# Patient Record
Sex: MALE | Race: WHITE | NOT HISPANIC OR LATINO | Employment: OTHER | URBAN - METROPOLITAN AREA
[De-identification: names, ages, dates, MRNs, and addresses within clinical notes are randomized per-mention and may not be internally consistent; named-entity substitution may affect disease eponyms.]

---

## 2018-04-21 ENCOUNTER — APPOINTMENT (EMERGENCY)
Dept: RADIOLOGY | Facility: HOSPITAL | Age: 68
End: 2018-04-21
Attending: EMERGENCY MEDICINE
Payer: MEDICARE

## 2018-04-21 ENCOUNTER — HOSPITAL ENCOUNTER (EMERGENCY)
Facility: HOSPITAL | Age: 68
Discharge: HOME | End: 2018-04-21
Attending: EMERGENCY MEDICINE
Payer: MEDICARE

## 2018-04-21 VITALS
OXYGEN SATURATION: 96 % | TEMPERATURE: 97.4 F | WEIGHT: 231 LBS | HEART RATE: 72 BPM | BODY MASS INDEX: 36.26 KG/M2 | SYSTOLIC BLOOD PRESSURE: 143 MMHG | DIASTOLIC BLOOD PRESSURE: 77 MMHG | HEIGHT: 67 IN | RESPIRATION RATE: 18 BRPM

## 2018-04-21 DIAGNOSIS — S42.201A CLOSED FRACTURE OF PROXIMAL END OF RIGHT HUMERUS, UNSPECIFIED FRACTURE MORPHOLOGY, INITIAL ENCOUNTER: ICD-10-CM
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
## 2018-04-21 DIAGNOSIS — T14.8XXA ABRASION: ICD-10-CM

## 2018-04-21 PROCEDURE — 63700000 HC SELF-ADMINISTRABLE DRUG: Performed by: NURSE PRACTITIONER

## 2018-04-21 PROCEDURE — 73080 X-RAY EXAM OF ELBOW: CPT | Mod: RT

## 2018-04-21 PROCEDURE — 99283 EMERGENCY DEPT VISIT LOW MDM: CPT

## 2018-04-21 PROCEDURE — 73030 X-RAY EXAM OF SHOULDER: CPT | Mod: RT

## 2018-04-21 RX ORDER — LISINOPRIL 10 MG/1
10 TABLET ORAL NIGHTLY
COMMUNITY

## 2018-04-21 RX ORDER — MONTELUKAST SODIUM 10 MG/1
TABLET ORAL NIGHTLY
COMMUNITY

## 2018-04-21 RX ORDER — ROSUVASTATIN CALCIUM 20 MG/1
20 TABLET, COATED ORAL NIGHTLY
COMMUNITY

## 2018-04-21 RX ORDER — IBUPROFEN 600 MG/1
600 TABLET ORAL ONCE
Status: COMPLETED | OUTPATIENT
Start: 2018-04-21 | End: 2018-04-21

## 2018-04-21 RX ORDER — OMEPRAZOLE 20 MG/1
20 CAPSULE, DELAYED RELEASE ORAL
COMMUNITY

## 2018-04-21 RX ORDER — FLUTICASONE PROPIONATE 110 UG/1
2 AEROSOL, METERED RESPIRATORY (INHALATION) 2 TIMES DAILY PRN
COMMUNITY

## 2018-04-21 RX ORDER — ASPIRIN 81 MG/1
81 TABLET ORAL DAILY
COMMUNITY

## 2018-04-21 RX ORDER — AMLODIPINE BESYLATE 5 MG/1
5 TABLET ORAL DAILY
COMMUNITY

## 2018-04-21 RX ADMIN — IBUPROFEN 600 MG: 600 TABLET ORAL at 16:54

## 2018-04-21 ASSESSMENT — ENCOUNTER SYMPTOMS
BACK PAIN: 0
DIZZINESS: 0
WEAKNESS: 0
COUGH: 0
HEADACHES: 0
SHORTNESS OF BREATH: 0
NAUSEA: 0
WOUND: 1
COLOR CHANGE: 0
CONFUSION: 0
NECK PAIN: 0
LIGHT-HEADEDNESS: 0
VOMITING: 0
FATIGUE: 0
NUMBNESS: 0
DYSURIA: 0
FLANK PAIN: 0
ABDOMINAL PAIN: 0
DIFFICULTY URINATING: 0

## 2018-04-21 NOTE — DISCHARGE INSTRUCTIONS
As discussed please call your primary care doctor and orthopedist today to inform them of your ER visit and arrange a follow-up appointment within the next 2-3 days.  If you do not have one we have provided you with one. You do not have to see this provider in particular, just call the office and ask for the earliest available appointment.  Please return immediately for any worsening, new or concerning symptoms such as confusion, dizziness, numbness, color change, etc    Please take ibuprofen (i.e. Motrin or Advil) as per package instructions with food do not exceed 3200mg in 24 hours    Please take acetaminophen (Tylenol) as per package instructions.  Please do not exceed 3 g in 24 hours    Please use ice or heat depending on which feels better for you.  Please do not apply directly to the skin.  Please do not leave in place while asleep.  Please place for 10-15 minutes and then remove it for 10-15 minutes    It is imperative that you  follow-up with an orthopedist for your injury so he/she may follow and ensure proper follow up treatment and healing.  If you do not follow-up you may run the risk in the future of poor healing, chronic pain, loss of function etc.    Please clean your wound daily with mild soap and water and dressed with topical antibiotic of your choosing. You may also cover with bandage.  Please be sure to change the dressing at least once a day.  Please be aware of any signs or symptoms of infection such as fever, redness, yellow discharge etc.  If any of these occur please seek immediate medical attention.    Please wear shoulder immobilizer until seen and cleared by orthopedics in 2-3 days. No use of R arm until that time.

## 2018-04-21 NOTE — ED PROVIDER NOTES
Patient states just prior to arrival having a mechanical fall for which she tripped on a curb injuring his right shoulder, right elbow and right knee.  Patient has abrasions to right elbow and right knee.  Abrasions very superficial and no active bleeding.  States tetanus up-to-date.  Patient states pain to right shoulder worsened with movement along with decreased range of motion.  Denies any home intervention.  Denies any head injury or loss of consciousness.  No other symptoms injury.  Right-hand dominant        History provided by:  Patient   used: No        Past Medical History:   Diagnosis Date   • CAD (coronary artery disease)    • Hypertension    • Myocardial infarction    • Type 2 diabetes mellitus (CMS/HCC) (HCC)        Past Surgical History:   Procedure Laterality Date   • CORONARY ARTERY BYPASS GRAFT         Allergies   Allergen Reactions   • Clopidogrel Hives   • Penicillins      Other reaction(s): RASH AS CHILD       History   Smoking Status   • Former Smoker   Smokeless Tobacco   • Never Used       History   Alcohol Use   • Yes     Comment: socially       History   Drug Use No       No LMP for male patient.    Review of Systems   Constitutional: Negative for fatigue.   HENT: Negative for nosebleeds.    Eyes: Negative for visual disturbance.   Respiratory: Negative for cough and shortness of breath.    Cardiovascular: Negative for chest pain.   Gastrointestinal: Negative for abdominal pain, nausea and vomiting.   Genitourinary: Negative for difficulty urinating, dysuria and flank pain.   Musculoskeletal: Negative for back pain, gait problem and neck pain.        R shoulder/R elbow pain   Skin: Positive for wound (abrasions to R elbow, R knee). Negative for color change.   Neurological: Negative for dizziness, weakness, light-headedness, numbness and headaches.   Psychiatric/Behavioral: Negative for confusion.   All other systems reviewed and are negative.      Vitals:    04/21/18  "1618 04/21/18 1619 04/21/18 1746   BP:  (!) 142/83 (!) 143/77   BP Location:   Left upper arm   Patient Position:   Lying   Pulse:  68 72   Resp:  18 18   Temp:  36.3 °C (97.4 °F)    SpO2:  97% 96%   Weight: 105 kg (231 lb)     Height: 1.702 m (5' 7\")         Physical Exam   Constitutional: He appears well-developed and well-nourished.   HENT:   Head: Normocephalic and atraumatic. Head is without raccoon's eyes and without Bob's sign.   Airway intact   Eyes: EOM and lids are normal. Pupils are equal, round, and reactive to light.   Neck: Normal range of motion.   No cervical spine tenderness   Cardiovascular: Normal rate, regular rhythm, normal heart sounds and normal pulses.    Pulmonary/Chest: Effort normal and breath sounds normal. No respiratory distress. He exhibits no tenderness, no crepitus and no deformity.   No visible injury   Abdominal: Soft. Normal appearance and bowel sounds are normal. He exhibits no distension and no mass. There is no tenderness. There is no rigidity, no guarding and no CVA tenderness.   No visible trauma   Musculoskeletal:        Right shoulder: He exhibits decreased range of motion, tenderness, bony tenderness, swelling and pain. He exhibits no laceration and normal pulse.        Right elbow: He exhibits decreased range of motion and deformity (abrasion to R elbow). He exhibits no swelling. No tenderness found.        Right wrist: Normal.        Right hip: Normal.        Right knee: He exhibits deformity (abrasion). He exhibits normal range of motion, no swelling, no effusion, no ecchymosis, normal patellar mobility and no bony tenderness. No tenderness found.        Right ankle: Normal. He exhibits normal pulse.   No vertebral tenderness.    Neurological: He is alert. He has normal strength. No sensory deficit. GCS eye subscore is 4. GCS verbal subscore is 5. GCS motor subscore is 6.        Skin: Skin is warm, dry and intact. Capillary refill takes less than 2 seconds. "   Psychiatric: He has a normal mood and affect. His speech is normal and behavior is normal.   Nursing note and vitals reviewed.      Procedures    ED Course as of Apr 21 2140   Sat Apr 21, 2018   1648 Impression-Mechanical Fall, R shoulder/elbow pain, abrasions    Plan-XR shoulder/elbow, motrin, wound care   [JOSE]   1740 XR with proximal humerus fracture. Elbow without any acute fx or dislocation. Both interpreted with Dr Khoury    Patient to receive shoulder immobilizer and d/c to follow up with ortho    Discussed with DR Khoury, agreeable to workup/plan without any corrections/additions.    To patient's bedside. Patient exam unchanged. Patient sitting quietly and comfortably in the stretcher.  Discussed results and plan with patient.  Patient verbalized understanding and agreeable without any questions or concerns. Patient states his neighbor can pick him up from ED and feels capable/confident to care for himself at home.     [JOSE]      ED Course User Index  [JOSE] SELENA Amador         Clinical Impressions as of Apr 21 2140   Fall, initial encounter   Closed fracture of proximal end of right humerus, unspecified fracture morphology, initial encounter   Abrasion       Final diagnoses:   [W19.XXXA] Fall, initial encounter   [S42.201A] Closed fracture of proximal end of right humerus, unspecified fracture morphology, initial encounter   [T14.8XXA] Abrasion       Labs Reviewed - No data to display    X-RAY ELBOW RIGHT 3+ VIEWS   ED Interpretation   No acute fx or dislocation      X-RAY SHOULDER RIGHT 2+ VIEWS   ED Interpretation   Proximal humerus fracture. Interpreted with SELENA Jaramillo  04/21/18 2140

## 2018-04-30 ENCOUNTER — APPOINTMENT (OUTPATIENT)
Dept: LAB | Facility: HOSPITAL | Age: 68
End: 2018-04-30
Attending: INTERNAL MEDICINE
Payer: MEDICARE

## 2018-04-30 ENCOUNTER — TRANSCRIBE ORDERS (OUTPATIENT)
Dept: REGISTRATION | Facility: HOSPITAL | Age: 68
End: 2018-04-30

## 2018-04-30 DIAGNOSIS — E55.9 AVITAMINOSIS D: ICD-10-CM

## 2018-04-30 DIAGNOSIS — E11.9 DIABETES MELLITUS WITHOUT COMPLICATION (CMS/HCC): Primary | ICD-10-CM

## 2018-04-30 DIAGNOSIS — Z79.899 NEED FOR PROPHYLACTIC CHEMOTHERAPY: ICD-10-CM

## 2018-04-30 DIAGNOSIS — E11.9 DIABETES MELLITUS WITHOUT COMPLICATION (CMS/HCC): ICD-10-CM

## 2018-04-30 LAB
25(OH)D3 SERPL-MCNC: 25 NG/ML (ref 30–100)
ALBUMIN SERPL-MCNC: 4 G/DL (ref 3.4–5)
ALBUMIN/CREAT UR: 19.7 UG/MG
ALP SERPL-CCNC: 73 IU/L (ref 35–126)
ALT SERPL-CCNC: 23 IU/L (ref 16–63)
ANION GAP SERPL CALC-SCNC: 9 MEQ/L (ref 3–15)
AST SERPL-CCNC: 26 IU/L (ref 15–41)
BILIRUB SERPL-MCNC: 0.4 MG/DL (ref 0.3–1.2)
BUN SERPL-MCNC: 10 MG/DL (ref 8–20)
CALCIUM SERPL-MCNC: 9.4 MG/DL (ref 8.9–10.3)
CHLORIDE SERPL-SCNC: 102 MMOL/L (ref 98–109)
CO2 SERPL-SCNC: 26 MMOL/L (ref 22–32)
CREAT SERPL-MCNC: 0.9 MG/DL (ref 0.8–1.3)
CREAT UR-MCNC: 161.3 MG/DL
EST. AVERAGE GLUCOSE BLD GHB EST-MCNC: 174 MG/DL
GFR SERPL CREATININE-BSD FRML MDRD: >60 ML/MIN/1.73M*2
GLUCOSE SERPL-MCNC: 189 MG/DL (ref 70–99)
HBA1C MFR BLD HPLC: 7.7 %
MICROALBUMIN UR-MCNC: 31.7 MG/L
POTASSIUM SERPL-SCNC: 4 MMOL/L (ref 3.6–5.1)
PROT SERPL-MCNC: 6.6 G/DL (ref 6–8.2)
SODIUM SERPL-SCNC: 137 MMOL/L (ref 136–144)

## 2018-04-30 PROCEDURE — 36415 COLL VENOUS BLD VENIPUNCTURE: CPT

## 2018-04-30 PROCEDURE — 82306 VITAMIN D 25 HYDROXY: CPT

## 2018-04-30 PROCEDURE — 83036 HEMOGLOBIN GLYCOSYLATED A1C: CPT

## 2018-04-30 PROCEDURE — 82043 UR ALBUMIN QUANTITATIVE: CPT

## 2018-04-30 PROCEDURE — 82040 ASSAY OF SERUM ALBUMIN: CPT

## 2018-06-15 NOTE — ED ATTESTATION NOTE
I have personally seen and examined the patient.  I reviewed and agree with physician assistant / nurse practitioner’s assessment and plan of care, with the following exceptions: None  My examination, assessment, and plan of care of Devin Daniels is as follows:    This 68-year-old male tripped and fell injuring his right shoulder, right elbow, and right knee.  He complained mostly of pain in his right shoulder.  He takes Plavix but did not strike his head.  Examination revealed pain and swelling with tenderness in the right shoulder.  Some abrasions over the right elbow and right knee with mild pain and tenderness.. The patient's neurologic exam was intact w/o focal deficits. xrays were performed and there was a proximal right humerus fracture. There were no fractures in the right elbow or knee. The patient was placed in sling and discharged home to follow-up with orthopedics.             I was physically present for the key/critical portions of the following procedures: None           Adriel Khoury DO  06/14/18 2493

## 2018-06-22 ENCOUNTER — APPOINTMENT (OUTPATIENT)
Dept: LAB | Facility: HOSPITAL | Age: 68
End: 2018-06-22
Attending: INTERNAL MEDICINE
Payer: MEDICARE

## 2018-06-22 ENCOUNTER — TRANSCRIBE ORDERS (OUTPATIENT)
Dept: CARDIOLOGY | Facility: HOSPITAL | Age: 68
End: 2018-06-22

## 2018-06-22 DIAGNOSIS — E66.9 OBESITY: ICD-10-CM

## 2018-06-22 DIAGNOSIS — E78.00 PURE HYPERCHOLESTEROLEMIA: ICD-10-CM

## 2018-06-22 DIAGNOSIS — I10 ESSENTIAL (PRIMARY) HYPERTENSION: ICD-10-CM

## 2018-06-22 DIAGNOSIS — Z98.61 CORONARY ANGIOPLASTY STATUS: ICD-10-CM

## 2018-06-22 DIAGNOSIS — R94.31 ABNORMAL ELECTROCARDIOGRAM: ICD-10-CM

## 2018-06-22 DIAGNOSIS — R94.31 ABNORMAL ELECTROCARDIOGRAM: Primary | ICD-10-CM

## 2018-06-22 DIAGNOSIS — E11.9 TYPE 2 DIABETES MELLITUS WITHOUT COMPLICATIONS (CMS/HCC): ICD-10-CM

## 2018-06-22 DIAGNOSIS — I25.10 ATHEROSCLEROTIC HEART DISEASE OF NATIVE CORONARY ARTERY WITHOUT ANGINA PECTORIS: ICD-10-CM

## 2018-06-22 DIAGNOSIS — Z95.1 PRESENCE OF AORTOCORONARY BYPASS GRAFT: ICD-10-CM

## 2018-06-22 LAB
ALBUMIN SERPL-MCNC: 4.3 G/DL (ref 3.4–5)
ALP SERPL-CCNC: 79 IU/L (ref 35–126)
ALT SERPL-CCNC: 32 IU/L (ref 16–63)
ANION GAP SERPL CALC-SCNC: 9 MEQ/L (ref 3–15)
AST SERPL-CCNC: 26 IU/L (ref 15–41)
BILIRUB SERPL-MCNC: 0.5 MG/DL (ref 0.3–1.2)
BUN SERPL-MCNC: 12 MG/DL (ref 8–20)
CALCIUM SERPL-MCNC: 9.8 MG/DL (ref 8.9–10.3)
CHLORIDE SERPL-SCNC: 101 MMOL/L (ref 98–109)
CHOLEST SERPL-MCNC: 125 MG/DL
CO2 SERPL-SCNC: 28 MMOL/L (ref 22–32)
CREAT SERPL-MCNC: 1 MG/DL (ref 0.8–1.3)
EST. AVERAGE GLUCOSE BLD GHB EST-MCNC: 160 MG/DL
GFR SERPL CREATININE-BSD FRML MDRD: >60 ML/MIN/1.73M*2
GLUCOSE SERPL-MCNC: 129 MG/DL (ref 70–99)
HBA1C MFR BLD HPLC: 7.2 %
HDLC SERPL-MCNC: 47 MG/DL
HDLC SERPL: 2.7 {RATIO}
LDLC SERPL CALC-MCNC: 59 MG/DL
NONHDLC SERPL-MCNC: 78 MG/DL
POTASSIUM SERPL-SCNC: 4.9 MMOL/L (ref 3.6–5.1)
PROT SERPL-MCNC: 6.8 G/DL (ref 6–8.2)
SODIUM SERPL-SCNC: 138 MMOL/L (ref 136–144)
TRIGL SERPL-MCNC: 94 MG/DL (ref 30–149)

## 2018-06-22 PROCEDURE — 80061 LIPID PANEL: CPT

## 2018-06-22 PROCEDURE — 36415 COLL VENOUS BLD VENIPUNCTURE: CPT

## 2018-06-22 PROCEDURE — 83036 HEMOGLOBIN GLYCOSYLATED A1C: CPT

## 2018-06-22 PROCEDURE — 80053 COMPREHEN METABOLIC PANEL: CPT

## 2018-12-24 ENCOUNTER — HOSPITAL ENCOUNTER (EMERGENCY)
Facility: HOSPITAL | Age: 68
Discharge: HOME | End: 2018-12-24
Attending: EMERGENCY MEDICINE
Payer: MEDICARE

## 2018-12-24 ENCOUNTER — APPOINTMENT (EMERGENCY)
Dept: RADIOLOGY | Facility: HOSPITAL | Age: 68
End: 2018-12-24
Payer: MEDICARE

## 2018-12-24 VITALS
BODY MASS INDEX: 36.88 KG/M2 | HEART RATE: 87 BPM | RESPIRATION RATE: 18 BRPM | OXYGEN SATURATION: 96 % | HEIGHT: 67 IN | TEMPERATURE: 97.8 F | WEIGHT: 235 LBS | SYSTOLIC BLOOD PRESSURE: 136 MMHG | DIASTOLIC BLOOD PRESSURE: 75 MMHG

## 2018-12-24 DIAGNOSIS — S99.912A INJURY OF LEFT ANKLE, INITIAL ENCOUNTER: ICD-10-CM
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
## 2018-12-24 PROCEDURE — 99283 EMERGENCY DEPT VISIT LOW MDM: CPT | Mod: 25

## 2018-12-24 PROCEDURE — 73610 X-RAY EXAM OF ANKLE: CPT | Mod: LT

## 2018-12-24 PROCEDURE — 73630 X-RAY EXAM OF FOOT: CPT | Mod: LT

## 2018-12-24 RX ORDER — NITROGLYCERIN 0.3 MG/1
0.3 TABLET SUBLINGUAL EVERY 5 MIN PRN
COMMUNITY
End: 2019-05-05

## 2018-12-24 RX ORDER — AZELASTINE 1 MG/ML
1 SPRAY, METERED NASAL 2 TIMES DAILY PRN
COMMUNITY

## 2018-12-24 RX ORDER — ALBUTEROL SULFATE 0.83 MG/ML
2.5 SOLUTION RESPIRATORY (INHALATION) EVERY 6 HOURS PRN
COMMUNITY

## 2018-12-24 RX ORDER — LIRAGLUTIDE 6 MG/ML
1.8 INJECTION SUBCUTANEOUS NIGHTLY
COMMUNITY

## 2018-12-24 ASSESSMENT — ENCOUNTER SYMPTOMS
COLOR CHANGE: 0
VOMITING: 0
WEAKNESS: 0
ARTHRALGIAS: 1
JOINT SWELLING: 1
DIARRHEA: 0
CHILLS: 0
NAUSEA: 0
BACK PAIN: 0
SHORTNESS OF BREATH: 0
FEVER: 0
NUMBNESS: 0

## 2018-12-24 NOTE — DISCHARGE INSTRUCTIONS
Return to the emergency department at any time for any worsening symptoms.  Go to Dr. Salazar's office to follow up from your emergency department visit. Rest and elevate the affected painful area.  Apply cold compresses intermittently as needed.  As pain recedes, begin normal activities slowly as tolerated. Take motrin or tylenol as needed for pain.

## 2018-12-24 NOTE — ED ATTESTATION NOTE
Procedures  Physical Exam  Review of Systems    12/24/20188:37 AM  I have personally seen and examined the patient.  I reviewed and agree with the PA/NP/Resident's assessment and plan of care.    My examination, assessment, and plan of care of Devin Daniels is as follows:  The patient presents with left lower extremity pain and swelling.  Patient fell off a ladder 6 days ago.  Exam: Left foot is ecchymotic but most of his pain is the left lateral ankle region  Impression/Plan: X-ray, splint, follow-up     I was physically present for the key/critical portions of the following procedures: None     Luther Hua MD  12/24/18 9848

## 2018-12-24 NOTE — ED PROVIDER NOTES
"HPI     Chief Complaint   Patient presents with   • Fall     pt presents s/p fall last tuesday off a ladder. Pt states he hit his leg on the coffee table. Pt states saturday \"i noticed blood between my big toe. I noticed swelling last night\". Pt reports the pain decreasing over time. Pt denies hitting his head with the fall, takes 81mg ASA daily.    • Ankle Pain   • Leg Swelling     This is a 68 year old male who presents to the ED c/o left ankle pain s/p fall x6 days ago. Pt was decorating his [employer] tree when he fell 2-3 feet off a ladder onto his couch but at the time he hit his left ankle on the coffee table. Pt admits to pain at the time that was improving but has since worsened after a long day of walking yesterday. Pt reports an increase in swelling and bruising since being on his feet yesterday. Pt denies knee pain or increased pain with ambulation. Pt takes 81mg aspirin daily. Pt denies previous injury or surgery to the left ankle. Pt is not followed by a Podiatrist.         History provided by:  Patient and spouse   used: No         Patient History     Past Medical History:   Diagnosis Date   • CAD (coronary artery disease)    • Hypertension    • Myocardial infarction (CMS/HCC) ([facility])    • Type 2 diabetes mellitus (CMS/HCC) ([facility])        Past Surgical History:   Procedure Laterality Date   • CORONARY ARTERY BYPASS GRAFT     • SINUS SURGERY         History reviewed. No pertinent family history.    Social History   Substance Use Topics   • Smoking status: Former Smoker   • Smokeless tobacco: Never Used   • Alcohol use Yes      Comment: occ       Systems Reviewed from Nursing Triage:          Review of Systems     Review of Systems   Constitutional: Negative for chills and fever.   Respiratory: Negative for shortness of breath.    Cardiovascular: Negative for chest pain.   Gastrointestinal: Negative for diarrhea, nausea and vomiting.   Musculoskeletal: Positive for arthralgias (left " "ankle) and joint swelling. Negative for back pain and gait problem.   Skin: Negative for color change.   Neurological: Negative for weakness and numbness.   All other systems reviewed and are negative.       Physical Exam     ED Triage Vitals [12/24/18 0817]   Temp Heart Rate Resp BP SpO2   37.2 °C (99 °F) 97 18 (!) 144/77 96 %      Temp Source Heart Rate Source Patient Position BP Location FiO2 (%) (Set)   Temporal -- Sitting Right upper arm --                     Patient Vitals for the past 24 hrs:   BP Temp Temp src Pulse Resp SpO2 Height Weight   12/24/18 0817 (!) 144/77 37.2 °C (99 °F) Temporal 97 18 96 % - -   12/24/18 0813 - - - - - - 1.702 m (5' 7\") 107 kg (235 lb)           Physical Exam   Constitutional: He is oriented to person, place, and time. He appears well-developed. No distress.   HENT:   Head: Normocephalic and atraumatic.   Right Ear: External ear normal.   Left Ear: External ear normal.   Nose: Nose normal.   Eyes: Conjunctivae are normal.   Neck: Normal range of motion.   Cardiovascular:   Pulses:       Dorsalis pedis pulses are 2+ on the right side, and 2+ on the left side.   Pulmonary/Chest: Effort normal.   Musculoskeletal: Normal range of motion. He exhibits no deformity.        Left ankle: He exhibits swelling (across distal mid foot ) and ecchymosis. Tenderness.   1+ pitting edema from lower shin extending into the let foot    Neurological: He is alert and oriented to person, place, and time. No cranial nerve deficit.   Skin: Skin is warm and dry. He is not diaphoretic.   Psychiatric: He has a normal mood and affect. His behavior is normal.   Nursing note and vitals reviewed.           Procedures    ED Course & MDM     Labs Reviewed - No data to display    No orders to display               MDM  Number of Diagnoses or Management Options  Fall, initial encounter:   Injury of left ankle, initial encounter:      Amount and/or Complexity of Data Reviewed  Tests in the radiology section of " CPT®: reviewed  Discuss the patient with other providers: yes    Risk of Complications, Morbidity, and/or Mortality  Presenting problems: low  Diagnostic procedures: low  Management options: low    Patient Progress  Patient progress: stable          I: L ankle/foot injury s/p fall   P: XR, will continue to monitor. Pt declines pain control at this time.   8:37 AM     XR negative for fracture, will d/w podiatry.   9:21 AM     D/w Dr. Sandoval, will d/c to Dr. Gao's office to f/u and get placed in walking boot. Pt and wife in agreement w/plan. The pt is in agreement with the discharge plan at this time. All questions answered appropriately, discussed reasons to return to the ED as well.    9:49 AM     Clinical Impressions as of Dec 24 1830   Fall, initial encounter   Injury of left ankle, initial encounter      By signing my name below, I, Elvie Dickey, attest that this documentation has been prepared under the direction and in the presence of Radha Ruiz PA-C.  12/24/2018 8:31 AM         Elvie Dickey  12/24/18 0843       Divina Lind PA C  12/24/18 183

## 2018-12-27 ENCOUNTER — TRANSCRIBE ORDERS (OUTPATIENT)
Dept: SCHEDULING | Age: 68
End: 2018-12-27

## 2018-12-27 DIAGNOSIS — R22.42 LOCALIZED SWELLING, MASS AND LUMP, LEFT LOWER LIMB: Primary | ICD-10-CM

## 2018-12-28 ENCOUNTER — APPOINTMENT (OUTPATIENT)
Dept: LAB | Facility: HOSPITAL | Age: 68
End: 2018-12-28
Attending: INTERNAL MEDICINE
Payer: MEDICARE

## 2018-12-28 ENCOUNTER — TRANSCRIBE ORDERS (OUTPATIENT)
Dept: LAB | Facility: HOSPITAL | Age: 68
End: 2018-12-28

## 2018-12-28 ENCOUNTER — HOSPITAL ENCOUNTER (OUTPATIENT)
Dept: CARDIOLOGY | Facility: HOSPITAL | Age: 68
Discharge: HOME | End: 2018-12-28
Attending: INTERNAL MEDICINE
Payer: MEDICARE

## 2018-12-28 DIAGNOSIS — R22.42 LOCALIZED SWELLING, MASS AND LUMP, LEFT LOWER LIMB: ICD-10-CM

## 2018-12-28 DIAGNOSIS — R06.00 DYSPNEA: ICD-10-CM

## 2018-12-28 DIAGNOSIS — Z98.61 CORONARY ANGIOPLASTY STATUS: ICD-10-CM

## 2018-12-28 DIAGNOSIS — R94.31 ABNORMAL ELECTROCARDIOGRAM: ICD-10-CM

## 2018-12-28 DIAGNOSIS — I10 ESSENTIAL (PRIMARY) HYPERTENSION: ICD-10-CM

## 2018-12-28 DIAGNOSIS — E66.9 OBESITY: ICD-10-CM

## 2018-12-28 DIAGNOSIS — E55.9 VITAMIN D DEFICIENCY: ICD-10-CM

## 2018-12-28 DIAGNOSIS — E78.00 PURE HYPERCHOLESTEROLEMIA: ICD-10-CM

## 2018-12-28 DIAGNOSIS — I25.10 ATHEROSCLEROTIC HEART DISEASE OF NATIVE CORONARY ARTERY WITHOUT ANGINA PECTORIS: ICD-10-CM

## 2018-12-28 DIAGNOSIS — Z95.1 PRESENCE OF AORTOCORONARY BYPASS GRAFT: ICD-10-CM

## 2018-12-28 DIAGNOSIS — E11.9 TYPE 2 DIABETES MELLITUS WITHOUT COMPLICATIONS (CMS/HCC): ICD-10-CM

## 2018-12-28 DIAGNOSIS — E11.9 TYPE 2 DIABETES MELLITUS WITHOUT COMPLICATIONS (CMS/HCC): Primary | ICD-10-CM

## 2018-12-28 LAB
EST. AVERAGE GLUCOSE BLD GHB EST-MCNC: 169 MG/DL
HBA1C MFR BLD HPLC: 7.5 %

## 2018-12-28 PROCEDURE — 36415 COLL VENOUS BLD VENIPUNCTURE: CPT

## 2018-12-28 PROCEDURE — 83036 HEMOGLOBIN GLYCOSYLATED A1C: CPT

## 2018-12-28 PROCEDURE — 93971 EXTREMITY STUDY: CPT | Mod: LT

## 2018-12-28 PROCEDURE — 82652 VIT D 1 25-DIHYDROXY: CPT

## 2018-12-31 LAB
1,25(OH)2D SERPL-MCNC: 49 PG/ML (ref 18–72)
1,25(OH)2D2 SERPL-MCNC: <8 PG/ML
1,25(OH)2D3 SERPL-MCNC: 49 PG/ML

## 2019-01-21 ENCOUNTER — APPOINTMENT (OUTPATIENT)
Dept: LAB | Facility: HOSPITAL | Age: 69
End: 2019-01-21
Attending: INTERNAL MEDICINE
Payer: MEDICARE

## 2019-01-21 DIAGNOSIS — E66.9 OBESITY: ICD-10-CM

## 2019-01-21 DIAGNOSIS — E55.9 VITAMIN D DEFICIENCY: ICD-10-CM

## 2019-01-21 DIAGNOSIS — E11.9 TYPE 2 DIABETES MELLITUS WITHOUT COMPLICATIONS (CMS/HCC): ICD-10-CM

## 2019-01-21 DIAGNOSIS — R94.31 ABNORMAL ELECTROCARDIOGRAM: ICD-10-CM

## 2019-01-21 DIAGNOSIS — R06.00 DYSPNEA: ICD-10-CM

## 2019-01-21 DIAGNOSIS — E78.00 PURE HYPERCHOLESTEROLEMIA: ICD-10-CM

## 2019-01-21 DIAGNOSIS — Z98.61 CORONARY ANGIOPLASTY STATUS: ICD-10-CM

## 2019-01-21 DIAGNOSIS — I10 ESSENTIAL (PRIMARY) HYPERTENSION: ICD-10-CM

## 2019-01-21 DIAGNOSIS — Z95.1 PRESENCE OF AORTOCORONARY BYPASS GRAFT: ICD-10-CM

## 2019-01-21 DIAGNOSIS — I25.10 ATHEROSCLEROTIC HEART DISEASE OF NATIVE CORONARY ARTERY WITHOUT ANGINA PECTORIS: ICD-10-CM

## 2019-01-21 LAB
ALBUMIN SERPL-MCNC: 4.4 G/DL (ref 3.4–5)
ALP SERPL-CCNC: 82 IU/L (ref 35–126)
ALT SERPL-CCNC: 35 IU/L (ref 16–63)
ANION GAP SERPL CALC-SCNC: 9 MEQ/L (ref 3–15)
AST SERPL-CCNC: 31 IU/L (ref 15–41)
BASOPHILS # BLD: 0.07 K/UL (ref 0.01–0.1)
BASOPHILS NFR BLD: 0.7 %
BILIRUB SERPL-MCNC: 0.7 MG/DL (ref 0.3–1.2)
BUN SERPL-MCNC: 9 MG/DL (ref 8–20)
CALCIUM SERPL-MCNC: 10 MG/DL (ref 8.9–10.3)
CHLORIDE SERPL-SCNC: 102 MEQ/L (ref 98–109)
CHOLEST SERPL-MCNC: 116 MG/DL
CO2 SERPL-SCNC: 28 MEQ/L (ref 22–32)
CREAT SERPL-MCNC: 0.9 MG/DL
DIFFERENTIAL METHOD BLD: ABNORMAL
EOSINOPHIL # BLD: 0.26 K/UL (ref 0.04–0.54)
EOSINOPHIL NFR BLD: 2.5 %
ERYTHROCYTE [DISTWIDTH] IN BLOOD BY AUTOMATED COUNT: 11.9 % (ref 11.6–14.4)
EST. AVERAGE GLUCOSE BLD GHB EST-MCNC: 180 MG/DL
GFR SERPL CREATININE-BSD FRML MDRD: >60 ML/MIN/1.73M*2
GLUCOSE SERPL-MCNC: 168 MG/DL (ref 70–99)
HBA1C MFR BLD HPLC: 7.9 %
HCT VFR BLDCO AUTO: 43.7 %
HDLC SERPL-MCNC: 42 MG/DL
HDLC SERPL: 2.8 {RATIO}
HGB BLD-MCNC: 14.8 G/DL
IMM GRANULOCYTES # BLD AUTO: 0.03 K/UL (ref 0–0.08)
IMM GRANULOCYTES NFR BLD AUTO: 0.3 %
LDLC SERPL CALC-MCNC: 53 MG/DL
LYMPHOCYTES # BLD: 2.46 K/UL (ref 1.2–3.5)
LYMPHOCYTES NFR BLD: 23.2 %
MCH RBC QN AUTO: 29.7 PG (ref 28–33.2)
MCHC RBC AUTO-ENTMCNC: 33.9 G/DL (ref 32.2–36.5)
MCV RBC AUTO: 87.6 FL (ref 83–98)
MONOCYTES # BLD: 0.74 K/UL (ref 0.3–1)
MONOCYTES NFR BLD: 7 %
NEUTROPHILS # BLD: 7.03 K/UL (ref 1.7–7)
NEUTS SEG NFR BLD: 66.3 %
NONHDLC SERPL-MCNC: 74 MG/DL
NRBC BLD-RTO: 0 %
PDW BLD AUTO: 10.8 FL (ref 9.4–12.4)
PLATELET # BLD AUTO: 271 K/UL
POTASSIUM SERPL-SCNC: 4.6 MEQ/L (ref 3.6–5.1)
PROT SERPL-MCNC: 7.1 G/DL (ref 6–8.2)
RBC # BLD AUTO: 4.99 M/UL (ref 4.5–5.8)
SODIUM SERPL-SCNC: 139 MEQ/L (ref 136–144)
TRIGL SERPL-MCNC: 105 MG/DL (ref 30–149)
WBC # BLD AUTO: 10.59 K/UL

## 2019-01-21 PROCEDURE — 80053 COMPREHEN METABOLIC PANEL: CPT

## 2019-01-21 PROCEDURE — 36415 COLL VENOUS BLD VENIPUNCTURE: CPT

## 2019-01-21 PROCEDURE — 83036 HEMOGLOBIN GLYCOSYLATED A1C: CPT

## 2019-01-21 PROCEDURE — 84478 ASSAY OF TRIGLYCERIDES: CPT

## 2019-01-21 PROCEDURE — 85025 COMPLETE CBC W/AUTO DIFF WBC: CPT

## 2019-05-05 ENCOUNTER — HOSPITAL ENCOUNTER (OUTPATIENT)
Facility: HOSPITAL | Age: 69
Setting detail: OBSERVATION
Discharge: HOME | End: 2019-05-06
Attending: EMERGENCY MEDICINE | Admitting: INTERNAL MEDICINE
Payer: MEDICARE

## 2019-05-05 ENCOUNTER — APPOINTMENT (EMERGENCY)
Dept: RADIOLOGY | Facility: HOSPITAL | Age: 69
End: 2019-05-05
Attending: EMERGENCY MEDICINE
Payer: MEDICARE

## 2019-05-05 DIAGNOSIS — R07.9 CHEST PAIN, UNSPECIFIED TYPE: Primary | ICD-10-CM

## 2019-05-05 LAB
ALBUMIN SERPL-MCNC: 4 G/DL (ref 3.4–5)
ALP SERPL-CCNC: 64 IU/L (ref 35–126)
ALT SERPL-CCNC: 32 IU/L (ref 16–63)
ANION GAP SERPL CALC-SCNC: 10 MEQ/L (ref 3–15)
AST SERPL-CCNC: 27 IU/L (ref 15–41)
BASOPHILS # BLD: 0.04 K/UL (ref 0.01–0.1)
BASOPHILS NFR BLD: 0.5 %
BILIRUB SERPL-MCNC: 0.3 MG/DL (ref 0.3–1.2)
BUN SERPL-MCNC: 11 MG/DL (ref 8–20)
CALCIUM SERPL-MCNC: 8.9 MG/DL (ref 8.9–10.3)
CHLORIDE SERPL-SCNC: 103 MEQ/L (ref 98–109)
CO2 SERPL-SCNC: 24 MEQ/L (ref 22–32)
CREAT SERPL-MCNC: 0.7 MG/DL
DIFFERENTIAL METHOD BLD: NORMAL
EOSINOPHIL # BLD: 0.21 K/UL (ref 0.04–0.54)
EOSINOPHIL NFR BLD: 2.7 %
ERYTHROCYTE [DISTWIDTH] IN BLOOD BY AUTOMATED COUNT: 12.2 % (ref 11.6–14.4)
GFR SERPL CREATININE-BSD FRML MDRD: >60 ML/MIN/1.73M*2
GLUCOSE BLD-MCNC: 140 MG/DL (ref 70–99)
GLUCOSE SERPL-MCNC: 181 MG/DL (ref 70–99)
HCT VFR BLDCO AUTO: 38.6 %
HGB BLD-MCNC: 13.1 G/DL
IMM GRANULOCYTES # BLD AUTO: 0.01 K/UL (ref 0–0.08)
IMM GRANULOCYTES NFR BLD AUTO: 0.1 %
LYMPHOCYTES # BLD: 2.13 K/UL (ref 1.2–3.5)
LYMPHOCYTES NFR BLD: 27 %
MCH RBC QN AUTO: 29.2 PG (ref 28–33.2)
MCHC RBC AUTO-ENTMCNC: 33.9 G/DL (ref 32.2–36.5)
MCV RBC AUTO: 86.2 FL (ref 83–98)
MONOCYTES # BLD: 0.53 K/UL (ref 0.3–1)
MONOCYTES NFR BLD: 6.7 %
NEUTROPHILS # BLD: 4.96 K/UL (ref 1.7–7)
NEUTS SEG NFR BLD: 63 %
NRBC BLD-RTO: 0 %
PDW BLD AUTO: 9.9 FL (ref 9.4–12.4)
PLATELET # BLD AUTO: 207 K/UL
POCT TEST: ABNORMAL
POTASSIUM SERPL-SCNC: 3.9 MEQ/L (ref 3.6–5.1)
PROT SERPL-MCNC: 6.6 G/DL (ref 6–8.2)
RBC # BLD AUTO: 4.48 M/UL (ref 4.5–5.8)
SODIUM SERPL-SCNC: 137 MEQ/L (ref 136–144)
TROPONIN I SERPL-MCNC: <0.02 NG/ML
WBC # BLD AUTO: 7.88 K/UL

## 2019-05-05 PROCEDURE — 96372 THER/PROPH/DIAG INJ SC/IM: CPT

## 2019-05-05 PROCEDURE — 36415 COLL VENOUS BLD VENIPUNCTURE: CPT | Performed by: EMERGENCY MEDICINE

## 2019-05-05 PROCEDURE — 99220 PR INITIAL OBSERVATION CARE/DAY 70 MINUTES: CPT | Performed by: INTERNAL MEDICINE

## 2019-05-05 PROCEDURE — 80053 COMPREHEN METABOLIC PANEL: CPT | Performed by: EMERGENCY MEDICINE

## 2019-05-05 PROCEDURE — 63600000 HC DRUGS/DETAIL CODE: Performed by: INTERNAL MEDICINE

## 2019-05-05 PROCEDURE — 84484 ASSAY OF TROPONIN QUANT: CPT | Performed by: EMERGENCY MEDICINE

## 2019-05-05 PROCEDURE — 3E013GC INTRODUCTION OF OTHER THERAPEUTIC SUBSTANCE INTO SUBCUTANEOUS TISSUE, PERCUTANEOUS APPROACH: ICD-10-PCS | Performed by: INTERNAL MEDICINE

## 2019-05-05 PROCEDURE — G0378 HOSPITAL OBSERVATION PER HR: HCPCS

## 2019-05-05 PROCEDURE — 96374 THER/PROPH/DIAG INJ IV PUSH: CPT

## 2019-05-05 PROCEDURE — 63700000 HC SELF-ADMINISTRABLE DRUG: Performed by: INTERNAL MEDICINE

## 2019-05-05 PROCEDURE — 71045 X-RAY EXAM CHEST 1 VIEW: CPT

## 2019-05-05 PROCEDURE — 99285 EMERGENCY DEPT VISIT HI MDM: CPT | Mod: 25

## 2019-05-05 PROCEDURE — 3E033NZ INTRODUCTION OF ANALGESICS, HYPNOTICS, SEDATIVES INTO PERIPHERAL VEIN, PERCUTANEOUS APPROACH: ICD-10-PCS | Performed by: INTERNAL MEDICINE

## 2019-05-05 PROCEDURE — 93005 ELECTROCARDIOGRAM TRACING: CPT | Performed by: EMERGENCY MEDICINE

## 2019-05-05 PROCEDURE — 85025 COMPLETE CBC W/AUTO DIFF WBC: CPT | Performed by: EMERGENCY MEDICINE

## 2019-05-05 RX ORDER — INSULIN ASPART 100 [IU]/ML
3-11 INJECTION, SOLUTION INTRAVENOUS; SUBCUTANEOUS
Status: DISCONTINUED | OUTPATIENT
Start: 2019-05-05 | End: 2019-05-06 | Stop reason: HOSPADM

## 2019-05-05 RX ORDER — METFORMIN HYDROCHLORIDE 500 MG/1
1000 TABLET ORAL 2 TIMES DAILY WITH MEALS
COMMUNITY

## 2019-05-05 RX ORDER — PANTOPRAZOLE SODIUM 20 MG/1
20 TABLET, DELAYED RELEASE ORAL
Status: DISCONTINUED | OUTPATIENT
Start: 2019-05-05 | End: 2019-05-06 | Stop reason: HOSPADM

## 2019-05-05 RX ORDER — ROSUVASTATIN CALCIUM 20 MG/1
20 TABLET, COATED ORAL DAILY
Status: DISCONTINUED | OUTPATIENT
Start: 2019-05-05 | End: 2019-05-06 | Stop reason: HOSPADM

## 2019-05-05 RX ORDER — IBUPROFEN 200 MG
16-32 TABLET ORAL AS NEEDED
Status: DISCONTINUED | OUTPATIENT
Start: 2019-05-05 | End: 2019-05-06 | Stop reason: HOSPADM

## 2019-05-05 RX ORDER — BUDESONIDE 0.5 MG/2ML
0.5 INHALANT ORAL
Status: DISCONTINUED | OUTPATIENT
Start: 2019-05-05 | End: 2019-05-06 | Stop reason: HOSPADM

## 2019-05-05 RX ORDER — MORPHINE SULFATE 4 MG/ML
2 INJECTION, SOLUTION INTRAMUSCULAR; INTRAVENOUS EVERY 5 MIN PRN
Status: DISCONTINUED | OUTPATIENT
Start: 2019-05-05 | End: 2019-05-06 | Stop reason: HOSPADM

## 2019-05-05 RX ORDER — DEXTROSE 40 %
15-30 GEL (GRAM) ORAL AS NEEDED
Status: DISCONTINUED | OUTPATIENT
Start: 2019-05-05 | End: 2019-05-06 | Stop reason: HOSPADM

## 2019-05-05 RX ORDER — ALBUTEROL SULFATE 0.83 MG/ML
2.5 SOLUTION RESPIRATORY (INHALATION) EVERY 6 HOURS PRN
Status: DISCONTINUED | OUTPATIENT
Start: 2019-05-05 | End: 2019-05-06 | Stop reason: HOSPADM

## 2019-05-05 RX ORDER — TEMAZEPAM 15 MG/1
15 CAPSULE ORAL NIGHTLY
Status: DISCONTINUED | OUTPATIENT
Start: 2019-05-06 | End: 2019-05-06 | Stop reason: HOSPADM

## 2019-05-05 RX ORDER — ASPIRIN 81 MG/1
81 TABLET ORAL DAILY
Status: DISCONTINUED | OUTPATIENT
Start: 2019-05-06 | End: 2019-05-06 | Stop reason: HOSPADM

## 2019-05-05 RX ORDER — HEPARIN SODIUM 5000 [USP'U]/ML
5000 INJECTION, SOLUTION INTRAVENOUS; SUBCUTANEOUS EVERY 8 HOURS
Status: DISCONTINUED | OUTPATIENT
Start: 2019-05-05 | End: 2019-05-06 | Stop reason: HOSPADM

## 2019-05-05 RX ORDER — LIRAGLUTIDE 6 MG/ML
1.8 INJECTION SUBCUTANEOUS NIGHTLY
Status: DISCONTINUED | OUTPATIENT
Start: 2019-05-06 | End: 2019-05-06

## 2019-05-05 RX ORDER — FLUTICASONE PROPIONATE 110 UG/1
1 AEROSOL, METERED RESPIRATORY (INHALATION) DAILY
Status: DISCONTINUED | OUTPATIENT
Start: 2019-05-06 | End: 2019-05-05

## 2019-05-05 RX ORDER — ACETAMINOPHEN 325 MG/1
650 TABLET ORAL EVERY 4 HOURS PRN
Status: DISCONTINUED | OUTPATIENT
Start: 2019-05-05 | End: 2019-05-06 | Stop reason: HOSPADM

## 2019-05-05 RX ORDER — TEMAZEPAM 15 MG/1
15 CAPSULE ORAL NIGHTLY
Status: DISCONTINUED | OUTPATIENT
Start: 2019-05-06 | End: 2019-05-05

## 2019-05-05 RX ORDER — LISINOPRIL 10 MG/1
10 TABLET ORAL DAILY
Status: DISCONTINUED | OUTPATIENT
Start: 2019-05-05 | End: 2019-05-06 | Stop reason: HOSPADM

## 2019-05-05 RX ORDER — DEXTROSE 50 % IN WATER (D50W) INTRAVENOUS SYRINGE
25 AS NEEDED
Status: DISCONTINUED | OUTPATIENT
Start: 2019-05-05 | End: 2019-05-06 | Stop reason: HOSPADM

## 2019-05-05 RX ORDER — METFORMIN HYDROCHLORIDE 500 MG/1
1000 TABLET ORAL 2 TIMES DAILY WITH MEALS
Status: CANCELLED | OUTPATIENT
Start: 2019-05-06

## 2019-05-05 RX ORDER — AMLODIPINE BESYLATE 5 MG/1
5 TABLET ORAL DAILY
Status: DISCONTINUED | OUTPATIENT
Start: 2019-05-06 | End: 2019-05-06 | Stop reason: HOSPADM

## 2019-05-05 RX ORDER — MONTELUKAST SODIUM 10 MG/1
10 TABLET ORAL NIGHTLY
Status: DISCONTINUED | OUTPATIENT
Start: 2019-05-05 | End: 2019-05-06 | Stop reason: HOSPADM

## 2019-05-05 RX ADMIN — NITROGLYCERIN 0.5 INCH: 20 OINTMENT TOPICAL at 22:22

## 2019-05-05 RX ADMIN — HEPARIN SODIUM 5000 UNITS: 5000 INJECTION INTRAVENOUS; SUBCUTANEOUS at 23:58

## 2019-05-05 RX ADMIN — ROSUVASTATIN CALCIUM 20 MG: 20 TABLET, FILM COATED ORAL at 23:58

## 2019-05-05 RX ADMIN — MONTELUKAST 10 MG: 10 TABLET, FILM COATED ORAL at 23:58

## 2019-05-05 RX ADMIN — LISINOPRIL 10 MG: 10 TABLET ORAL at 23:58

## 2019-05-05 RX ADMIN — PANTOPRAZOLE SODIUM 20 MG: 20 TABLET, DELAYED RELEASE ORAL at 23:58

## 2019-05-05 ASSESSMENT — COGNITIVE AND FUNCTIONAL STATUS - GENERAL
TOILETING: 4 - NONE
HELP NEEDED FOR BATHING: 4 - NONE
WALKING IN HOSPITAL ROOM: 4 - NONE
CLIMB 3 TO 5 STEPS WITH RAILING: 4 - NONE
DRESSING REGULAR UPPER BODY CLOTHING: 4 - NONE
HELP NEEDED FOR PERSONAL GROOMING: 4 - NONE
EATING MEALS: 4 - NONE
STANDING UP FROM CHAIR USING ARMS: 4 - NONE
DRESSING REGULAR LOWER BODY CLOTHING: 4 - NONE
MOVING TO AND FROM BED TO CHAIR: 4 - NONE

## 2019-05-05 ASSESSMENT — ENCOUNTER SYMPTOMS
NAUSEA: 1
SHORTNESS OF BREATH: 0
COLOR CHANGE: 0
BACK PAIN: 0
COUGH: 0
PALPITATIONS: 0
VOMITING: 0
EYE PAIN: 0
FEVER: 0
CHILLS: 0
ABDOMINAL PAIN: 0
SORE THROAT: 0
SEIZURES: 0
DYSURIA: 0
ARTHRALGIAS: 0
HEMATURIA: 0

## 2019-05-05 NOTE — ED PROVIDER NOTES
HPI     Chief Complaint   Patient presents with   • Chest Pain       68yoM with hx HTN, CAD with CABG, PE here for CP. CP intermittent for a few days. Left sided, mostly dull with occasional sharp aspect. Sometimes with dizziness, nausea but no diaphoresis. No fever, chills. Endorses mild cough but feels it is his allergies. Recent car trip to [place], walked several miles on the beach there with no symptoms. CP comes on randomly. Not assoc with deep breath, exertion, or position. No leg pain or leg swelling. Follows Dr Wood and Dr Whitley. Last stress test 2 years ago and benign. Last routine f/u last month and benign. Tried pepcid last night and seemed to help but did not help today. Symptoms returned at 1600 and this time, with tingling down left arm, which is new. Came by ambulance and given ASA and NTG. No change in pain with the NTG. Currently pain is mild and dull.              Patient History     Past Medical History:   Diagnosis Date   • BRVO (branch retinal vein occlusion)     left   • CAD (coronary artery disease)    • Cardiac arrest (CMS/HCC)    • Chronic sinusitis    • COPD (chronic obstructive pulmonary disease) (CMS/HCC) (HCC)    • Hypertension    • Lipid disorder    • Myocardial infarction (CMS/HCC) (HCC)    • Pulmonary embolism (CMS/HCC) (HCC)    • Type 2 diabetes mellitus (CMS/HCC) (HCC)        Past Surgical History:   Procedure Laterality Date   • CORONARY ARTERY BYPASS GRAFT     • ROTATOR CUFF REPAIR     • SINUS SURGERY         Family History   Problem Relation Age of Onset   • Family history unknown: Yes       Social History   Substance Use Topics   • Smoking status: Former Smoker     Types: Cigarettes     Quit date: 1992   • Smokeless tobacco: Never Used   • Alcohol use Yes      Comment: occ       Systems Reviewed from Nursing Triage:  Tobacco  Allergies  Meds  Problems  Med Hx  Surg Hx  Fam Hx  Soc Hx           Review of Systems     Review of Systems   Constitutional: Negative for  "chills and fever.   HENT: Negative for ear pain and sore throat.    Eyes: Negative for pain and visual disturbance.   Respiratory: Negative for cough and shortness of breath.    Cardiovascular: Positive for chest pain. Negative for palpitations and leg swelling.   Gastrointestinal: Positive for nausea (resolved). Negative for abdominal pain and vomiting.   Genitourinary: Negative for dysuria and hematuria.   Musculoskeletal: Negative for arthralgias and back pain.   Skin: Negative for color change and rash.   Neurological: Negative for seizures and syncope.   All other systems reviewed and are negative.       Physical Exam     ED Triage Vitals [05/05/19 1907]   Temp Heart Rate Resp BP SpO2   36.7 °C (98.1 °F) 85 18 (!) 159/81 97 %      Temp Source Heart Rate Source Patient Position BP Location FiO2 (%) (Set)   Temporal Monitor Lying Right upper arm --       Pulse Ox %: 97 % (05/05/19 2048)  Pulse Ox Interpretation: Normal (05/05/19 2048)  Heart Rate: 85 (05/05/19 2048)  Rhythm Strip Interpretation: Normal Sinus Rhythm (05/05/19 2048)    Patient Vitals for the past 24 hrs:   BP Temp Temp src Pulse Resp SpO2 Height Weight   05/06/19 0300 129/88 36.5 °C (97.7 °F) Oral 91 18 95 % - -   05/05/19 2300 (!) 159/92 - - 84 - - - -   05/05/19 2250 - - - - - - 1.702 m (5' 7\") -   05/05/19 2239 (!) 180/107 36.7 °C (98 °F) Oral 86 18 97 % - -   05/05/19 2227 - - - - - - - 111 kg (244 lb)   05/05/19 2159 - - - 82 16 96 % - -   05/05/19 2138 (!) 174/92 - - 80 18 96 % - -   05/05/19 2100 - - - 80 15 96 % - -   05/05/19 1907 (!) 159/81 36.7 °C (98.1 °F) Temporal 85 18 97 % 1.702 m (5' 7\") 108 kg (239 lb)           Physical Exam   Constitutional: He is oriented to person, place, and time. He appears well-developed and well-nourished.   HENT:   Head: Normocephalic and atraumatic.   Eyes: Conjunctivae are normal.   Neck: Neck supple.   Cardiovascular: Normal rate and regular rhythm.    No murmur heard.  Pulmonary/Chest: Effort normal " and breath sounds normal. No respiratory distress. He exhibits no tenderness.   No rash, swelling, deformity over area of pain   Abdominal: Soft. There is no tenderness.   Obese   Musculoskeletal: He exhibits no edema.   Neurological: He is alert and oriented to person, place, and time.   Skin: Skin is warm and dry.   Psychiatric: He has a normal mood and affect.   Nursing note and vitals reviewed.           Procedures    ED Course & MDM     Labs Reviewed   COMPREHENSIVE METABOLIC PANEL - Abnormal        Result Value    Sodium 137      Potassium 3.9      Chloride 103      CO2 24      BUN 11      Creatinine 0.7 (*)     Glucose 181 (*)     Calcium 8.9      AST (SGOT) 27      ALT (SGPT) 32      Alkaline Phosphatase 64      Total Protein 6.6      Albumin 4.0      Bilirubin, Total 0.3      eGFR >60.0      Anion Gap 10     CBC - Abnormal     WBC 7.88      RBC 4.48 (*)     Hemoglobin 13.1 (*)     Hematocrit 38.6 (*)     MCV 86.2      MCH 29.2      MCHC 33.9      RDW 12.2      Platelets 207      MPV 9.9     POCT GLUCOSE (BEAKER) - Abnormal     POCT Bedside Glucose 140 (*)     POC Test POC     TROPONIN I - Normal    Troponin I <0.02     TROPONIN I - Normal    Troponin I <0.02     TROPONIN I - Normal    Troponin I <0.02     CBC AND DIFFERENTIAL    Narrative:     The following orders were created for panel order CBC and differential.  Procedure                               Abnormality         Status                     ---------                               -----------         ------                     CBC[[id number]]                           Abnormal            Final result               Diff Count[[id number]]                                        Final result                 Please view results for these tests on the individual orders.   RAINBOW DRAW PANEL    Narrative:     The following orders were created for panel order [facility] Draw Panel.  Procedure                               Abnormality         Status                      ---------                               -----------         ------                     RAINBOW RED[[id number]]                                       Final result               RAINBOW LT BLUE[[id number]]                                   Final result               RAINBOW GOLD[[id number]]                                      Final result                 Please view results for these tests on the individual orders.   DIFF COUNT    Differential Type Auto      nRBC 0.0      Immature Granulocytes 0.1      Neutrophils 63.0      Lymphocytes 27.0      Monocytes 6.7      Eosinophils 2.7      Basophils 0.5      Immature Granulocytes, Absolute 0.01      Neutrophils, Absolute 4.96      Lymphocytes, Absolute 2.13      Monocytes, Absolute 0.53      Eosinophils, Absolute 0.21      Basophils, Absolute 0.04     POCT GLUCOSE   POCT GLUCOSE   RAINBOW RED   RAINBOW LT BLUE   RAINBOW GOLD       X-RAY CHEST 1 VIEW   ED Interpretation   NAD      ECG 12 lead   ED Interpretation   NSR. TWI II, III, AVF, similar to prior. No ST elevation                  MDM  Number of Diagnoses or Management Options  Diagnosis management comments: 68yoM with obesity, DM, CAD with CABG, PE here for CP. Symptoms not suggestive of PE, nor infectious etiology. Concerning for ACS given extensive hx. ECG shows TWIs similar to prior. ASA, NTG already given by EMS. Offered analgesics however pt declined. Discussed labs and admission for ACS rule out. Patient is agreeable.        Amount and/or Complexity of Data Reviewed  Clinical lab tests: ordered and reviewed  Tests in the radiology section of CPT®: ordered and reviewed  Tests in the medicine section of CPT®: ordered and reviewed  Independent visualization of images, tracings, or specimens: yes             ED Course as of May 06 0655   Sun May 05, 2019   2037 Labs, CXR benign. Will admit  [DW]   2047 Case was discussed with hospitalist.  Reviewed patient's presentation, ED course, and relevant data.  Hospitalist  accepts patient on their service and will see / admit pt.   [DW]      ED Course User Index  [DW] Thelma Fuentes MD         Clinical Impressions as of May 06 0655   Chest pain, unspecified type        Thelma Fuentes MD  05/06/19 0656

## 2019-05-06 ENCOUNTER — APPOINTMENT (OUTPATIENT)
Dept: RADIOLOGY | Facility: HOSPITAL | Age: 69
Setting detail: OBSERVATION
End: 2019-05-06
Attending: NURSE PRACTITIONER
Payer: MEDICARE

## 2019-05-06 ENCOUNTER — APPOINTMENT (OUTPATIENT)
Dept: CARDIOLOGY | Facility: HOSPITAL | Age: 69
Setting detail: OBSERVATION
End: 2019-05-06
Attending: NURSE PRACTITIONER
Payer: MEDICARE

## 2019-05-06 VITALS
BODY MASS INDEX: 38.3 KG/M2 | RESPIRATION RATE: 18 BRPM | SYSTOLIC BLOOD PRESSURE: 157 MMHG | DIASTOLIC BLOOD PRESSURE: 80 MMHG | OXYGEN SATURATION: 96 % | WEIGHT: 244 LBS | TEMPERATURE: 98.2 F | HEIGHT: 67 IN | HEART RATE: 90 BPM

## 2019-05-06 PROBLEM — J44.9 COPD (CHRONIC OBSTRUCTIVE PULMONARY DISEASE) (CMS/HCC): Status: ACTIVE | Noted: 2019-05-06

## 2019-05-06 PROBLEM — I25.10 CAD (CORONARY ARTERY DISEASE): Status: ACTIVE | Noted: 2019-05-06

## 2019-05-06 PROBLEM — I10 HYPERTENSION: Status: ACTIVE | Noted: 2019-05-06

## 2019-05-06 PROBLEM — E11.9 TYPE 2 DIABETES MELLITUS (CMS/HCC): Status: ACTIVE | Noted: 2019-05-06

## 2019-05-06 LAB
ATRIAL RATE: 86
GLUCOSE BLD-MCNC: 125 MG/DL (ref 70–99)
GLUCOSE BLD-MCNC: 146 MG/DL (ref 70–99)
P AXIS: 52
POCT TEST: ABNORMAL
POCT TEST: ABNORMAL
PR INTERVAL: 206
QRS DURATION: 84
QT INTERVAL: 356
QTC CALCULATION(BAZETT): 426
R AXIS: 59
STRESS BASELINE BP: NORMAL MMHG
STRESS BASELINE HR: 95 BPM
STRESS PERCENT HR: 93 %
STRESS POST ESTIMATED WORKLOAD: 7 METS
STRESS POST EXERCISE DUR MIN: 6 MIN
STRESS POST EXERCISE DUR SEC: 0 SEC
STRESS POST PEAK BP: NORMAL MMHG
STRESS POST PEAK HR: 142 BPM
STRESS TARGET HR: 130 BPM
T WAVE AXIS: 7
TROPONIN I SERPL-MCNC: <0.02 NG/ML
TROPONIN I SERPL-MCNC: <0.02 NG/ML
VENTRICULAR RATE: 86

## 2019-05-06 PROCEDURE — 63600000 HC DRUGS/DETAIL CODE: Performed by: INTERNAL MEDICINE

## 2019-05-06 PROCEDURE — 3E033HZ INTRODUCTION OF RADIOACTIVE SUBSTANCE INTO PERIPHERAL VEIN, PERCUTANEOUS APPROACH: ICD-10-PCS | Performed by: INTERNAL MEDICINE

## 2019-05-06 PROCEDURE — A9500 TC99M SESTAMIBI: HCPCS | Performed by: NURSE PRACTITIONER

## 2019-05-06 PROCEDURE — 99226 PR SBSQ OBSERVATION CARE/DAY 35 MINUTES: CPT | Performed by: HOSPITALIST

## 2019-05-06 PROCEDURE — G0378 HOSPITAL OBSERVATION PER HR: HCPCS

## 2019-05-06 PROCEDURE — 78452 HT MUSCLE IMAGE SPECT MULT: CPT

## 2019-05-06 PROCEDURE — 93017 CV STRESS TEST TRACING ONLY: CPT

## 2019-05-06 PROCEDURE — 84484 ASSAY OF TROPONIN QUANT: CPT | Mod: 91 | Performed by: INTERNAL MEDICINE

## 2019-05-06 PROCEDURE — 84484 ASSAY OF TROPONIN QUANT: CPT | Performed by: INTERNAL MEDICINE

## 2019-05-06 PROCEDURE — 36415 COLL VENOUS BLD VENIPUNCTURE: CPT | Performed by: INTERNAL MEDICINE

## 2019-05-06 PROCEDURE — 63700000 HC SELF-ADMINISTRABLE DRUG: Performed by: INTERNAL MEDICINE

## 2019-05-06 PROCEDURE — 4A02XM4 MEASUREMENT OF CARDIAC TOTAL ACTIVITY, EXTERNAL APPROACH: ICD-10-PCS | Performed by: INTERNAL MEDICINE

## 2019-05-06 PROCEDURE — 25000000 HC PHARMACY GENERAL: Performed by: INTERNAL MEDICINE

## 2019-05-06 RX ORDER — LIRAGLUTIDE 6 MG/ML
1.8 INJECTION SUBCUTANEOUS NIGHTLY
Status: DISCONTINUED | OUTPATIENT
Start: 2019-05-06 | End: 2019-05-06 | Stop reason: HOSPADM

## 2019-05-06 RX ORDER — TEMAZEPAM 15 MG/1
15 CAPSULE ORAL NIGHTLY PRN
COMMUNITY

## 2019-05-06 RX ADMIN — TEMAZEPAM 15 MG: 15 CAPSULE ORAL at 01:12

## 2019-05-06 RX ADMIN — BUDESONIDE 0.5 MG: 0.5 INHALANT RESPIRATORY (INHALATION) at 00:04

## 2019-05-06 RX ADMIN — ASPIRIN 81 MG: 81 TABLET, COATED ORAL at 08:30

## 2019-05-06 RX ADMIN — LIRAGLUTIDE 1.8 MG: 6 INJECTION SUBCUTANEOUS at 00:22

## 2019-05-06 RX ADMIN — AMLODIPINE BESYLATE 5 MG: 5 TABLET ORAL at 08:31

## 2019-05-06 RX ADMIN — HEPARIN SODIUM 5000 UNITS: 5000 INJECTION INTRAVENOUS; SUBCUTANEOUS at 14:50

## 2019-05-06 RX ADMIN — KIT FOR THE PREPARATION OF TECHNETIUM TC99M SESTAMIBI 44 MILLICURIE: 1 INJECTION, POWDER, LYOPHILIZED, FOR SOLUTION PARENTERAL at 13:45

## 2019-05-06 RX ADMIN — PANTOPRAZOLE SODIUM 20 MG: 20 TABLET, DELAYED RELEASE ORAL at 08:31

## 2019-05-06 RX ADMIN — KIT FOR THE PREPARATION OF TECHNETIUM TC99M SESTAMIBI 14 MILLICURIE: 1 INJECTION, POWDER, LYOPHILIZED, FOR SOLUTION PARENTERAL at 11:49

## 2019-05-06 RX ADMIN — MORPHINE SULFATE 2 MG: 4 INJECTION INTRAVENOUS at 03:39

## 2019-05-06 NOTE — CONSULTS
"  CARDIOLOGY CONSULT NOTE     REASON FOR CONSULT: chest pain    CONSULT FROM: Randy Guillen MD    HPI     Devin Daniels is a 68 y.o. male who was admitted with chest pain    Primary cardiology: Dr. SUNIL Oliveira    # cardiac risk factors: HTN, HL, DM 2, obesity, CATHIE- intolerant of CPAP  # CAD S/P LAD PTCA x2 1992,  MI/cardiac arrest/LAD stent 01/2003, CABG x2 03/2006  # H/O silent ischemia  # Aortic valve sclerosis  # PMH: PAD, asthma, GERD, Jacob esophagitis, spinal stenosis, retinal vein branch occlusion, anxiety, fall    Nuclear stress test 07/2017: Exercised 6 minutes on a standard Jeovanny protocol achieving 89% of heart rate.  No chest pain coronary perfusion images revealed a very small fixed apical thinning artifact without associated wall motion abnormality.  LVEF 49%.  Septal wall consistent with prior CABG.  Echo: 06/2013: Difficult study due to body habitus.  EF 55%, no evidence wall motion abnormality.  Cath: 02/2006      At baseline, the patient is independent and lives a mostly sedentary lifestyle.  He states he could ambulate a flight of stairs or walk for 30 minutes uninterrupted without exertional chest pain.      For the past 7 days he has had 4 or 5 episodes of the left arm numbness and left sided chest pain.  The chest pain occurs under his left breast and extends into his axillary.  Pain typically lasts 2 hours and sometimes improves with Pepcid or belching.  Yesterday his chest pain and left arm numbness lasted 12 hours did not improve with Pepcid and he came to the emergency room.  He was given nitro sublingual and paste without improvement initally. Pain resolved \"on own\".  Denies shortness of breath, orthopnea, PND, palpitations or syncope.  He admits to chronic ankle edema.    Pain was different than symptoms in past.     Admission labs: Enzymes negative x3, potassium 3.9, creatinine 0.7, BUN 11, hemoglobin 13    PAST MEDICAL HISTORY     Past Medical History:   Diagnosis Date   • BRVO " (branch retinal vein occlusion)     left   • CAD (coronary artery disease)    • Cardiac arrest (CMS/HCC)    • Chronic sinusitis    • COPD (chronic obstructive pulmonary disease) (CMS/HCC) ([facility])    • Hypertension    • Lipid disorder    • Myocardial infarction (CMS/HCC) ([facility])    • Pulmonary embolism (CMS/HCC) ([facility])    • Type 2 diabetes mellitus (CMS/HCC) ([facility])      Past Surgical History:   Procedure Laterality Date   • CORONARY ARTERY BYPASS GRAFT     • ROTATOR CUFF REPAIR     • SINUS SURGERY         MEDICATIONS     Home medications    •  metFORMIN, Take 1,000 mg by mouth 2 (two) times a day with meals.  •  temazepam, Take 15 mg by mouth nightly as needed for sleep.  •  albuterol, Take 2.5 mg by nebulization every 6 (six) hours as needed for wheezing.  •  amLODIPine, Take 5 mg by mouth daily.  •  aspirin, Take 81 mg by mouth daily.  •  azelastine, Administer 1 spray into each nostril 2 (two) times a day. Use in each nostril as directed.  •  fluticasone HFA, Inhale 2 puffs 2 (two) times a day. Rinse mouth with water after use to reduce aftertaste and incidence of candidiasis. Do not swallow.    •  liraglutide, Inject 1.2 mg under the skin daily.  •  lisinopril, Take 10 mg by mouth daily.  •  montelukast, Take by mouth nightly.  •  omeprazole, Take 20 mg by mouth 2 (two) times a day before breakfast and dinner.    •  rosuvastatin, Take 20 mg by mouth daily.    Inpatient Medications    •  acetaminophen, 650 mg, oral, q4h PRN  •  albuterol, 2.5 mg, nebulization, q6h PRN  •  amLODIPine, 5 mg, oral, Daily  •  aspirin, 81 mg, oral, Daily  •  budesonide, 0.5 mg, nebulization, BID (6a, 6p)  •  glucose, 16-32 g of dextrose, oral, PRN **OR** dextrose, 15-30 g of dextrose, oral, PRN **OR** glucagon, 1 mg, intramuscular, PRN **OR** dextrose in water, 25 mL, intravenous, PRN  •  heparin (porcine), 5,000 Units, subcutaneous, q8h YANN  •  insulin aspart U-100, 3-11 Units, subcutaneous, With meals & nightly  •  liraglutide, 1.8 mg,  subcutaneous, Nightly  •  lisinopril, 10 mg, oral, Daily  •  montelukast, 10 mg, oral, Nightly  •  morphine, 2 mg, intravenous, q5 min PRN  •  nitroglycerin, 0.5 inch, Topical, TID (6a, 12n, 6p)  •  pantoprazole, 20 mg, oral, BID AC  •  rosuvastatin, 20 mg, oral, Daily  •  temazepam, 15 mg, oral, Nightly    ALLERGIES     Clopidogrel and Penicillins    SOCIAL HISTORY      No smoking or alcohol    FAMILY HISTORY     No premature CAD    REVIEW OF SYSTEMS     Constitutional: denies fever, chills, weakness, weight loss  HEENT: denies blurred vision, sore throat, hoarseness  Respiratory: denies dyspnea, cough, hemoptysis  Cardiovascular: denies dyspnea, orthopnea, PND, edema, palpitations, syncope +chest pain with left arm numbness  Gastrointestinal: denies nausea, vomiting, diarrhea, hematemesis, melena  Genitourinary: denies dysuria, frequency  Integument: denies rash, itching  Hematologic/lymphatic:  denies bruising, petechiae  Musculoskeletal: denies arthalgias, myalgias  Neurological: denies vertigo, tremors, headache, speech deficit, focal weakness  Behavioral/Psych: denies anxiety and depression  Endocrine: denies cold intolerance, heat intolerance, weight change    PHYSICAL EXAM     VITAL SIGNS:  Temp:  [36.5 °C (97.7 °F)-36.7 °C (98.1 °F)] 36.7 °C (98 °F)  Heart Rate:  [80-91] 84  Resp:  [15-18] 18  BP: (129-180)/() 140/89  No intake or output data in the 24 hours ending 05/06/19 0839  PHYSICAL EXAM:  General appearance: alert, appears stated age and cooperative, +anxious  Head: without obvious abnormality  Eyes: PERRLA, extraocular movements intact  Neck: No JVD, carotid bruits, thyromegaly  Lungs: clear to auscultation bilaterally, no crackles or wheezing  Heart: regular rate and rhythm, S1-S2 normal, no murmurs, clicks, rubs or gallops  Abdomen: soft, non-tender; bowel sounds normal; no masses  Extremities: +1 BLLE edema, peripheral pulses present  Skin: Skin color, texture, turgor normal. No rashes or  lesions  Neurologic: Alert and oriented X 3, no focal deficits    LABS / IMAGING / EKG / TELEMETRY     LABS:    Results from last 7 days  Lab Units 19  0616 19  0004 19  1946   SODIUM mEQ/L  --   --  137   POTASSIUM mEQ/L  --   --  3.9   CHLORIDE mEQ/L  --   --  103   CO2 mEQ/L  --   --  24   BUN mg/dL  --   --  11   CREATININE mg/dL  --   --  0.7*   AST IU/L  --   --  27   ALT IU/L  --   --  32   TROPONIN I ng/mL <0.02 <0.02 <0.02       Results from last 7 days  Lab Units 19  1946   WBC K/uL 7.88   HEMOGLOBIN g/dL 13.1*   HEMATOCRIT % 38.6*   PLATELETS K/uL 207     Lab Results   Component Value Date    HGBA1C 7.9 (H) 2019    TSH 1.96 2016     Lab Results   Component Value Date    CHOL 116 2019    LDLCALC 53 2019    HDL 42 (L) 2019    TRIG 105 2019     No results found for: BNP    IMAGING:  Chest Xray: no acute cardiopulmonary disease    EC2019 1906 pm: sinus rhythm, normal axis, T wave abnormalities    TELEMETRY:   sinus rhythm, no alarms    ASSESSMENT AND PLAN     PROBLEM LIST:  Principal Problem:    Chest pain  Active Problems:    Type 2 diabetes mellitus (CMS/[facility]) ([facility])    Hypertension    CAD (coronary artery disease)    COPD (chronic obstructive pulmonary disease) (CMS/[facility]) ([facility])      ASSESSMENT AND PLAN:  1. Chest pain: recurrent, non-extertional chest pain. 4-5 episodes over the past week. Occurring at rest, improves with belching and at times Pepcid. Left sided chest pain includes left arm numbness. Enzymes negative times three, EKG with baseline ST abnormalities, although no change. Will need treadmill nuclear stress test.     2. HTN: periods of elevation. Amlodipine and lisinopril.     3. CAD: H/O LAD PTCA x2 , MI/cardiac arrest/LAD stent 2003 and CABG x2 . With prior silent ischemia. Most recent stress test 2017 showed normal perfusion with an apical thinning artifact, LVEF 49%.    4. Hyperlipidemia: Crestor    5. DM:  glucose elevated in past-defer to HMS    6. Obesity: BMI increased to 38     SELENA Ferrell  5/6/2019    Primary Care Doctor: Boston Verde MD

## 2019-05-06 NOTE — ASSESSMENT & PLAN NOTE
History of coronary artery disease, history of cardiac arrest  CABG approximately 13 years ago  Continuing aspirin and statin

## 2019-05-06 NOTE — [facility] PHYSICIAN REVIEW NOTE
Upgrade to inpatient for expected 2 night hospitalization for chest pain requiring stress test as inpatient due to concerning history.

## 2019-05-06 NOTE — DISCHARGE SUMMARY
Hospital Medicine Service -  Inpatient Discharge Summary        BRIEF OVERVIEW   Admitting Provider: Dana Heaton MD  Attending Provider: Randy Guillen MD Attending phys phone: [phone]    PCP: Boston Verde [phone]    Admission Date: 5/5/2019  Discharge Date: 5/6/2019     DISCHARGE DIAGNOSES      Primary Discharge Diagnosis  Chest pain    Secondary Discharge Diagnoses  Active Hospital Problems    Diagnosis Date Noted   • Type 2 diabetes mellitus (CMS/[facility]) ([facility]) 05/06/2019   • Hypertension 05/06/2019   • CAD (coronary artery disease) 05/06/2019   • COPD (chronic obstructive pulmonary disease) (CMS/[facility]) ([facility]) 05/06/2019   • Chest pain 05/05/2019      Resolved Hospital Problems    Diagnosis Date Noted Date Resolved   No resolved problems to display.       Problem List on Day of Discharge  COPD (chronic obstructive pulmonary disease) (CMS/[facility]) ([facility])   Assessment & Plan    Stable, no flare at this time.  Ordering DuoNeb's as needed     CAD (coronary artery disease)   Assessment & Plan    History of coronary artery disease, history of cardiac arrest  CABG approximately 13 years ago  Continuing aspirin and statin     Hypertension   Assessment & Plan    Blood pressure stable  Continuing lisinopril and amlodipine from home     Type 2 diabetes mellitus (CMS/[facility]) ([facility])   Assessment & Plan    Takes metformin at home, will hold for now  Last hemoglobin A1c 7.9.  On Victoza.  Accu-Cheks and sliding scale.     * Chest pain   Assessment & Plan    Patient presenting with intermittent chest pain from last 2 to 3 days got worse versus 4 PM prior to arrival with initially low intensity pain resolving on its own now intermittent became intensity pain resolving to low intensity but not going away despite using nitro and aspirin.  Patient with history of Gregory's esophagus and GERD has been on chronic Prilosec twice daily over-the-counter for years.  Troponins negative x3.  Had symptoms of paresthesia  involving left upper extremity.    Continuing aspirin and statin.  Cardiac stress test today depending on results may need cardiac cath versus discharge home with trial of oral Protonix 40 mg twice daily and follow-up with GI  Is not on any beta-blocker at home.  Will defer to cardiology.  Consulting cardiology.  Possible discharge today if cardiac stress test negative.       SUMMARY OF HOSPITALIZATION      Presenting Problem/History of Present Illness  Chest pain    This is a 68 y.o. year-old male admitted on 5/5/2019 with Chest pain [R07.9]  Chest pain, unspecified type [R07.9].    68 y.o. male with a past medical history of MI, coronary artery disease, CABG, history of cardiac arrest, diabetes type 2, hypertension, hyperlipidemia presenting with a 2 to 3 days history of intermittent chest pain which was described as low intensity 2-3 out of 10, left-sided, today at 4 PM prior to arrival developed excruciating pain anywhere from 6-7 in intensity, sharp pains, coming down to the intensity of 2-3 but not completely resolving.  Radiating to left arm with tingling sensation and heavy feeling, not associated with diaphoresis however reports his stomach being upset with bloating sensation, and belching, denies any nausea, vomiting did not have bowel movement today, noted diarrhea, denies any fever, has chronic sinusitis, no cough, denies any headache, lightheadedness, palpitations, diaphoresis.  Not relieved with nitroglycerin or aspirin.    Hospital Course    Pt admitted on 5/5/2019 p/w intermittent CP and upset stomach, r/o ACS. EKG with baseline ST abnormalities and troponins negative x3. Cardiology was consulted and recommended an exercise nuclear stress test which was negative.    Of note, pt's CP may be of a gastric etiology. Pt with h/o Gregory's esophagus and GERD, on chronic prilosec at home. Increased prilosec to BID and will advise pt to see his gastroenterologist Dr Mcbride for follow-up.    Today, pt's  symptoms have improve and is medically stable for discharge home with close outpatient follow-up as directed.    Exam on Day of Discharge  Physical Exam   Constitutional: He is oriented to person, place, and time. He appears well-developed and well-nourished.   HENT:   Head: Normocephalic and atraumatic.   Eyes: Pupils are equal, round, and reactive to light. EOM are normal.   Neck: Normal range of motion.   Cardiovascular: Normal rate, regular rhythm, normal heart sounds and intact distal pulses.    Pulmonary/Chest: Effort normal and breath sounds normal. No respiratory distress. He has no wheezes. He has no rales.   Abdominal: Soft. Bowel sounds are normal.   Obese    Musculoskeletal: Normal range of motion. He exhibits no edema.   Neurological: He is alert and oriented to person, place, and time.   Skin: Skin is warm and dry.   Psychiatric: He has a normal mood and affect. His behavior is normal. Judgment and thought content normal.   Nursing note and vitals reviewed.      Consults During Admission  IP CONSULT TO CARDIOLOGY    DISCHARGE MEDICATIONS     PENDING ORDERS (720h ago through future)        Ordered     metFORMIN (GLUCOPHAGE) tablet 1,000 mg  2 times daily with meals,   Discontinued:  --,   Status:  Canceled      Signed and Held         Medication List      ASK your doctor about these medications    albuterol 2.5 mg /3 mL (0.083 %) nebulizer solution  Take 2.5 mg by nebulization every 6 (six) hours as needed for wheezing.  Dose:  2.5 mg     amLODIPine 5 mg tablet  Commonly known as:  NORVASC  Take 5 mg by mouth daily.  Dose:  5 mg     aspirin 81 mg enteric coated tablet  Take 81 mg by mouth daily.  Dose:  81 mg     azelastine 137 mcg (0.1 %) nasal spray  Commonly known as:  ASTELIN  Administer 1 spray into each nostril 2 (two) times a day. Use in each nostril as directed.  Dose:  1 spray     fluticasone  mcg/actuation inhaler  Commonly known as:  FLOVENT HFA  Inhale 2 puffs 2 (two) times a day.  Rinse mouth with water after use to reduce aftertaste and incidence of candidiasis. Do not swallow.  Dose:  2 puff     liraglutide 0.6 mg/0.1 mL (18 mg/3 mL) injection  Commonly known as:  VICTOZA  Inject 1.2 mg under the skin daily.  Dose:  1.2 mg     lisinopril 10 mg tablet  Commonly known as:  PRINIVIL  Take 10 mg by mouth daily.  Dose:  10 mg     metFORMIN 500 mg tablet  Commonly known as:  GLUCOPHAGE  Take 1,000 mg by mouth 2 (two) times a day with meals.  Dose:  1000 mg     montelukast 10 mg tablet  Commonly known as:  SINGULAIR  Take by mouth nightly.     omeprazole 20 mg capsule  Commonly known as:  PriLOSEC  Take 20 mg by mouth 2 (two) times a day before breakfast and dinner.  Dose:  20 mg     rosuvastatin 20 mg tablet  Commonly known as:  CRESTOR  Take 20 mg by mouth daily.  Dose:  20 mg     temazepam 15 mg capsule  Commonly known as:  RESTORIL  Take 15 mg by mouth nightly as needed for sleep.  Dose:  15 mg                     PROCEDURES / LABS / IMAGING        Other Procedures  5/6/2019 Exercise stress test with myocardial perfusion SPECT          Pertinent Labs    Results from last 7 days  Lab Units 05/05/19  1946   WBC K/uL 7.88   HEMOGLOBIN g/dL 13.1*   HEMATOCRIT % 38.6*   PLATELETS K/uL 207         Results from last 7 days  Lab Units 05/05/19  1946   SODIUM mEQ/L 137   POTASSIUM mEQ/L 3.9   CHLORIDE mEQ/L 103   CO2 mEQ/L 24   BUN mg/dL 11   CREATININE mg/dL 0.7*   CALCIUM mg/dL 8.9   ALBUMIN g/dL 4.0   BILIRUBIN TOTAL mg/dL 0.3   ALK PHOS IU/L 64   ALT IU/L 32   AST IU/L 27   GLUCOSE mg/dL 181*         Pertinent Imaging  X-ray Chest 1 View    Result Date: 5/6/2019  IMPRESSION: No acute cardiopulmonary disease.      OUTPATIENT  FOLLOW-UP / REFERRALS / PENDING TESTS        Outpatient Follow-Up Appointments    Please follow up with your cardiologist Dr. Oliveira in 1 week for continued cardiac management    Please follow up with your gastroenterologist Dr Max Mcbride in 1-2 weeks for continued GERD,  kaminski's esophagus managment.    Please follow up with your PCP in 1-2 weeks for continued management of chronic conditions    Referrals  No orders of the defined types were placed in this encounter.      Test Results Pending at Discharge  Unresulted Labs     None          Important Issues to Address in Follow-Up  ***    DISCHARGE DISPOSITION      Disposition: Home     Code Status At Discharge: Full Code    Physician Order for Life-Sustaining Treatment Document Status      No documents found

## 2019-05-06 NOTE — H&P
Hospital Medicine Service -  History & Physical        CHIEF COMPLAINT     Chief Complaint   Patient presents with   • Chest Pain        HISTORY OF PRESENT ILLNESS      68 y.o. male with a past medical history of MI, coronary artery disease, CABG, history of cardiac arrest, diabetes type 2, hypertension, hyperlipidemia presenting with a 2 to 3 days history of intermittent chest pain which was described as low intensity 2-3 out of 10, left-sided, today at 4 PM prior to arrival developed excruciating pain anywhere from 6-7 in intensity, sharp pains, coming down to the intensity of 2-3 but not completely resolving.  Radiating to left arm with tingling sensation and heavy feeling, not associated with diaphoresis however reports his stomach being upset with bloating sensation, and belching, denies any nausea, vomiting did not have bowel movement today, noted diarrhea, denies any fever, has chronic sinusitis, no cough, denies any headache, lightheadedness, palpitations, diaphoresis.  Not relieved with nitroglycerin or aspirin.    PAST MEDICAL AND SURGICAL HISTORY      Past Medical History:   Diagnosis Date   • BRVO (branch retinal vein occlusion)     left   • CAD (coronary artery disease)    • Cardiac arrest (CMS/[facility])    • Chronic sinusitis    • COPD (chronic obstructive pulmonary disease) (CMS/[facility]) ([facility])    • Hypertension    • Lipid disorder    • Myocardial infarction (CMS/[facility]) ([facility])    • Pulmonary embolism (CMS/[facility]) ([facility])    • Type 2 diabetes mellitus (CMS/[facility]) ([facility])        Past Surgical History:   Procedure Laterality Date   • CORONARY ARTERY BYPASS GRAFT     • ROTATOR CUFF REPAIR     • SINUS SURGERY         MEDICATIONS      Prior to Admission medications    Medication Sig Start Date End Date Taking? Authorizing Provider   metFORMIN (GLUCOPHAGE) 500 mg tablet Take 1,000 mg by mouth 2 (two) times a day with meals.   Yes Provider, MD Daniel   temazepam (RESTORIL) 15 mg capsule Take 15 mg by mouth nightly as  needed for sleep.   Yes Daniel Nice MD   albuterol 2.5 mg /3 mL (0.083 %) nebulizer solution Take 2.5 mg by nebulization every 6 (six) hours as needed for wheezing.    Daniel Nice MD   amLODIPine (NORVASC) 5 mg tablet Take 5 mg by mouth daily.    Daniel Nice MD   aspirin 81 mg enteric coated tablet Take 81 mg by mouth daily.    Daniel Nice MD   azelastine (ASTELIN) 137 mcg (0.1 %) nasal spray Administer 1 spray into each nostril 2 (two) times a day. Use in each nostril as directed.    Daniel Nice MD   fluticasone HFA (FLOVENT HFA) 110 mcg/actuation inhaler Inhale 2 puffs 2 (two) times a day. Rinse mouth with water after use to reduce aftertaste and incidence of candidiasis. Do not swallow.      Daniel Nice MD   liraglutide (VICTOZA) 0.6 mg/0.1 mL (18 mg/3 mL) injection Inject 1.2 mg under the skin daily.    Daniel Nice MD   lisinopril (PRINIVIL) 10 mg tablet Take 10 mg by mouth daily.    Daniel Nice MD   montelukast (SINGULAIR) 10 mg tablet Take by mouth nightly.    Daniel Nice MD   omeprazole (PriLOSEC) 20 mg capsule Take 20 mg by mouth 2 (two) times a day before breakfast and dinner.      Daniel Nice MD   rosuvastatin (CRESTOR) 20 mg tablet Take 20 mg by mouth daily.    Daniel Nice MD       ALLERGIES      Clopidogrel and Penicillins    FAMILY HISTORY      Family History   Problem Relation Age of Onset   • Family history unknown: Yes       SOCIAL HISTORY      Social History     Social History   • Marital status:      Spouse name: N/A   • Number of children: N/A   • Years of education: N/A     Social History Main Topics   • Smoking status: Former Smoker     Types: Cigarettes     Quit date: 1992   • Smokeless tobacco: Never Used   • Alcohol use Yes      Comment: occ   • Drug use: No   • Sexual activity: Defer     Other Topics Concern   • None     Social History Narrative   • None       REVIEW OF  SYSTEMS        Review of Systems  Constitutional: Negative for fever, fatigue and unexpected weight change.   Eyes: Negative for visual disturbance. Mouth no sore throat  Respiratory: Negative for cough and shortness of breath.    Cardiovascular: Reporting constant chest discomfort.  Not completely resolving.  Denies any palpitations, diaphoresis, lightheadedness.   Gastrointestinal: Reports abdominal bloating and upset and belching.    Genitourinary: Negative for difficulty urinating. no hematuria no frequency no urgency no discharge  Musculoskeletal: Negative for arthralgias.   Skin: Negative for rash.   Neurological: Negative for headaches, focal weakness or numbness..   Hematological: Does not bruise/bleed easily.   Psychiatric/Behavioral: Negative for confusion and dysphoric mood no suicidal ideations          PHYSICAL EXAMINATION      Temp:  [36.5 °C (97.7 °F)-36.7 °C (98.1 °F)] 36.5 °C (97.7 °F)  Heart Rate:  [80-91] 91  Resp:  [15-18] 18  BP: (129-180)/() 129/88  Body mass index is 38.22 kg/m².    General exam : appears age stated, well nourished, not in distress  Head: atraumatic, normocephalic  Eyes : PERRLA, EOMI, no pallor, no icterus  ENT: no lesions, oropharynx pink, mucous membranes moist   Neck: supple, no Lymph nodes, no Thyromegaly, no JVD   CVS : normal rate, normal rhythm, S1 and S2 heard, no murmurs, rubs or gallops  Resp:normal accessory muscle usage, clear to auscultation Bilaterally  Abdomen : soft, Nt, BS +, no organomegaly   Extremities : no edema, no cyanosis   MSK: +DJD, no joint swellings, no joint tenderness   Skin: intact, warm, no rash  Neuro: AAO x3, no focal deficit.  Psych: normal mood.cooperative      LABS / IMAGING / EKG        Labs  CBC Results       05/05/19 01/21/19 11/21/17                    1945 1129 1223         WBC 7.88 10.59 (H) 8.26         RBC 4.48 (L) 4.99 5.06         HGB 13.1 (L) 14.8 14.9         HCT 38.6 (L) 43.7 42.5         MCV 86.2 87.6 84.0         MCH  29.2 29.7 29.4         MCHC 33.9 33.9 35.1          271 261                     CMP Results       05/05/19 01/21/19 06/22/18                    1945 1129 1336          139 138         K 3.9 4.6 4.9         Cl 103 102 101         CO2 24 28 28         Glucose 181 (H) 168 (H) 129 (H)         BUN 11 9 12         Creatinine 0.7 (L) 0.9 1.0         Calcium 8.9 10.0 9.8         Anion Gap 10 9 9         AST 27 31 26         ALT 32 35 32         Albumin 4.0 4.4 4.3         EGFR &gt;60.0 &gt;60.0 &gt;60.0         Comment for K at 1946 on 05/05/19:    Results obtained on plasma. Plasma Potassium values may be up to 0.4 mEQ/L less than serum values. The differences may be greater for patients with high platelet or white cell counts.          Troponin I Results       05/06/19 05/05/19 06/28/17                    0004 1946 2154         Troponin I &lt;0.02 &lt;0.02 &lt;0.02  A rise or fall of troponin with at least one abnormal value is consistent with myocardial injury or necrosis in the presence of appropriate clinical, ECG, and/or imaging abnormalities.                       Microbiology Results     ** No results found for the last 720 hours. **        UA Results     No lab values to display.          Imaging  X-RAY CHEST 1 VIEW   ED Interpretation   NAD      ECG 12 lead   ED Interpretation   NSR. TWI II, III, AVF, similar to prior. No ST elevation        CXR  Reviewed by me  Sternotomy sutures  Emphysematous changes.  No infiltrates, no congestion.    ECG/Telemetry  reviewed by me  Normal sinus rhythm with T wave inversions in left lateral leads which is old.    ASSESSMENT AND PLAN           * Chest pain   Assessment & Plan    Patient presenting with intermittent chest pain from last 2 to 3 days got worse versus 4 PM prior to arrival with initially low intensity pain resolving on its own now intermittent became intensity pain resolving to low intensity but not going away despite using nitro and aspirin.  Ordering  Nitropaste, checking 2 more sets of troponins, continuing aspirin, statin.  Is not on any beta-blocker at home.  Consulting cardiology.     COPD (chronic obstructive pulmonary disease) (CMS/[facility]) ([facility])   Assessment & Plan    Stable  Ordering DuoNeb's as needed     CAD (coronary artery disease)   Assessment & Plan    History of coronary artery disease, history of cardiac arrest  CABG  Continuing aspirin and statin  Ruling out ACS.     Hypertension   Assessment & Plan    Blood pressure stable  Continuing lisinopril and amlodipine from home     Type 2 diabetes mellitus (CMS/[facility]) ([facility])   Assessment & Plan    Takes metformin at home  Holding for now  Accu-Cheks and sliding scale.       Anticipate discharge in 24 hours if medically stable.    VTE Assessment: Padua    VTE Prophylaxis Plan: scd heparin  Code Status: Full Code       Dana Heaton MD  5/5/2019

## 2019-05-06 NOTE — PLAN OF CARE
Problem: Patient Care Overview  Goal: Plan of Care Review  Outcome: Ongoing (interventions implemented as appropriate)   05/06/19 0324   Coping/Psychosocial   Plan Of Care Reviewed With patient   Plan of Care Review   Progress no change   Outcome Summary no change in pt condition. Pt reports intermittnet chest pain, refuses pain medication. VsS. Refusing bed alarm. Will continue to monitor.       Problem: Pain, Acute (Adult)  Goal: Identify Related Risk Factors and Signs and Symptoms  Outcome: Ongoing (interventions implemented as appropriate)   05/06/19 0324   Pain, Acute   Related Risk Factors (Acute Pain) persistent pain   Signs and Symptoms (Acute Pain) sleep pattern alteration     Goal: Acceptable Pain Control/Comfort Level  Outcome: Ongoing (interventions implemented as appropriate)   05/06/19 0324   Pain, Acute (Adult)   Acceptable Pain Control/Comfort Level making progress toward outcome

## 2019-05-06 NOTE — PATIENT CARE CONFERENCE
Care Progression Rounds Note  Date: 5/6/2019  Time: 11:17 AM     Patient Name: Devin Daniels     Medical Record Number: [id number]   YOB: 1950  Sex: Male      Room/Bed: 0331W    Admitting Diagnosis: Chest pain [R07.9]  Chest pain, unspecified type [R07.9]   Admit Date/Time: 5/5/2019  7:00 PM    Primary Diagnosis: Chest pain  Principal Problem: Chest pain    GMLOS: pending  Anticipated Discharge Date: 5/6/2019    AM-PAC  Mobility Score: 24    Discharge Planning:  Living Arrangements: house  Concerns To Be Addressed: no discharge needs identified, denies needs/concerns at this time    Barriers to Discharge:  Barriers to Discharge: Test pending  Comment: stress test    Participants:  advanced practice provider, , social work/services, nursing, physical therapy

## 2019-05-06 NOTE — PLAN OF CARE
Problem: Patient Care Overview  Goal: Discharge Needs Assessment  Outcome: Ongoing (interventions implemented as appropriate)   05/06/19 1434   DC Needs Assessment   Concerns To Be Addressed no discharge needs identified   Chart reviewed, no identified d/c needs at this time, patient ambulating in room, not home bound, CM will continue to follow.

## 2019-05-06 NOTE — ASSESSMENT & PLAN NOTE
Takes metformin at home, will hold for now  Last hemoglobin A1c 7.9.  On Victoza.  Accu-Cheks and sliding scale.

## 2019-05-06 NOTE — NURSING NOTE
Pt was discharged to home. Prior to d/c med lock and cardiac monitor were removed. Pt was given discharge instructions. Pt voiced understnding of instructions.

## 2019-05-06 NOTE — DISCHARGE INSTRUCTIONS
Please follow up with your cardiologist Dr. Oliveira in 1 week for continued cardiac management    Please follow up with your gastroenterologist Dr Max Mcbride in 1-2 weeks for continued GERD and kaminski's esophagus managment.    Please follow up with your PCP in 1-2 weeks for continued management of chronic conditions      Please follow a healthy life style with heart-healthy diet (low-fat, low-salt, low-sugar)

## 2019-05-06 NOTE — NURSING NOTE
Pt denies any chest pain or discomfort this shift. Pt had stress test done this afternoon. Pt returned to room via a w/c at 1440.

## 2019-05-06 NOTE — ASSESSMENT & PLAN NOTE
Patient presenting with intermittent chest pain from last 2 to 3 days got worse versus 4 PM prior to arrival with initially low intensity pain resolving on its own now intermittent became intensity pain resolving to low intensity but not going away despite using nitro and aspirin.  Patient with history of Gregory's esophagus and GERD has been on chronic Prilosec twice daily over-the-counter for years.  Troponins negative x3.  Had symptoms of paresthesia involving left upper extremity.    Continuing aspirin and statin.  Cardiac stress test today depending on results may need cardiac cath versus discharge home with trial of oral Protonix 40 mg twice daily and follow-up with GI  Is not on any beta-blocker at home.  Will defer to cardiology.  Consulting cardiology.  Possible discharge today if cardiac stress test negative.

## 2019-07-30 ENCOUNTER — APPOINTMENT (OUTPATIENT)
Dept: LAB | Facility: HOSPITAL | Age: 69
End: 2019-07-30
Attending: INTERNAL MEDICINE
Payer: MEDICARE

## 2019-07-30 ENCOUNTER — TRANSCRIBE ORDERS (OUTPATIENT)
Dept: LAB | Facility: HOSPITAL | Age: 69
End: 2019-07-30

## 2019-07-30 DIAGNOSIS — E55.9 VITAMIN D DEFICIENCY: ICD-10-CM

## 2019-07-30 DIAGNOSIS — Z12.5 ENCOUNTER FOR SCREENING FOR MALIGNANT NEOPLASM OF PROSTATE: ICD-10-CM

## 2019-07-30 DIAGNOSIS — E11.9 TYPE 2 DIABETES MELLITUS WITHOUT COMPLICATIONS (CMS/HCC): ICD-10-CM

## 2019-07-30 DIAGNOSIS — E11.9 TYPE 2 DIABETES MELLITUS WITHOUT COMPLICATIONS (CMS/HCC): Primary | ICD-10-CM

## 2019-07-30 LAB
EST. AVERAGE GLUCOSE BLD GHB EST-MCNC: 160 MG/DL
HBA1C MFR BLD HPLC: 7.2 %
PSA SERPL-MCNC: 1.79 NG/ML

## 2019-07-30 PROCEDURE — G0103 PSA SCREENING: HCPCS

## 2019-07-30 PROCEDURE — 83036 HEMOGLOBIN GLYCOSYLATED A1C: CPT

## 2019-07-30 PROCEDURE — 36415 COLL VENOUS BLD VENIPUNCTURE: CPT

## 2019-07-30 PROCEDURE — 82306 VITAMIN D 25 HYDROXY: CPT

## 2019-07-31 LAB — 25(OH)D3 SERPL-MCNC: 39 NG/ML (ref 30–100)

## 2019-11-08 ENCOUNTER — TRANSCRIBE ORDERS (OUTPATIENT)
Dept: REGISTRATION | Facility: HOSPITAL | Age: 69
End: 2019-11-08

## 2019-11-08 ENCOUNTER — APPOINTMENT (OUTPATIENT)
Dept: LAB | Facility: HOSPITAL | Age: 69
End: 2019-11-08
Attending: INTERNAL MEDICINE
Payer: MEDICARE

## 2019-11-08 DIAGNOSIS — E11.9 TYPE 2 DIABETES MELLITUS WITHOUT COMPLICATIONS (CMS/HCC): Primary | ICD-10-CM

## 2019-11-08 DIAGNOSIS — M54.6 PAIN IN THORACIC SPINE: ICD-10-CM

## 2019-11-08 DIAGNOSIS — E11.9 TYPE 2 DIABETES MELLITUS WITHOUT COMPLICATIONS (CMS/HCC): ICD-10-CM

## 2019-11-08 PROCEDURE — 36415 COLL VENOUS BLD VENIPUNCTURE: CPT

## 2019-11-08 PROCEDURE — 82570 ASSAY OF URINE CREATININE: CPT

## 2019-11-08 PROCEDURE — 83036 HEMOGLOBIN GLYCOSYLATED A1C: CPT

## 2019-11-09 LAB
ALBUMIN/CREAT UR: 39.7 UG/MG
CREAT UR-MCNC: 137.2 MG/DL
EST. AVERAGE GLUCOSE BLD GHB EST-MCNC: 166 MG/DL
HBA1C MFR BLD HPLC: 7.4 %
MICROALBUMIN UR-MCNC: 54.4 MG/L

## 2019-11-18 ENCOUNTER — HOSPITAL ENCOUNTER (OUTPATIENT)
Dept: RADIOLOGY | Facility: HOSPITAL | Age: 69
Discharge: HOME | End: 2019-11-18
Attending: INTERNAL MEDICINE
Payer: MEDICARE

## 2019-11-18 DIAGNOSIS — M54.6 PAIN IN THORACIC SPINE: ICD-10-CM

## 2019-11-18 PROCEDURE — 71101 X-RAY EXAM UNILAT RIBS/CHEST: CPT | Mod: RT

## 2019-11-18 PROCEDURE — 72072 X-RAY EXAM THORAC SPINE 3VWS: CPT

## 2020-01-11 ENCOUNTER — TRANSCRIBE ORDERS (OUTPATIENT)
Dept: REGISTRATION | Facility: HOSPITAL | Age: 70
End: 2020-01-11

## 2020-01-11 ENCOUNTER — APPOINTMENT (OUTPATIENT)
Dept: LAB | Facility: HOSPITAL | Age: 70
End: 2020-01-11
Attending: INTERNAL MEDICINE
Payer: MEDICARE

## 2020-01-11 DIAGNOSIS — E78.00 PURE HYPERCHOLESTEROLEMIA: ICD-10-CM

## 2020-01-11 DIAGNOSIS — I10 ESSENTIAL (PRIMARY) HYPERTENSION: ICD-10-CM

## 2020-01-11 DIAGNOSIS — Z95.1 PRESENCE OF AORTOCORONARY BYPASS GRAFT: ICD-10-CM

## 2020-01-11 DIAGNOSIS — E11.9 TYPE 2 DIABETES MELLITUS WITHOUT COMPLICATIONS (CMS/HCC): ICD-10-CM

## 2020-01-11 DIAGNOSIS — I25.10 ATHEROSCLEROTIC HEART DISEASE OF NATIVE CORONARY ARTERY WITHOUT ANGINA PECTORIS: ICD-10-CM

## 2020-01-11 DIAGNOSIS — Z98.61 CORONARY ANGIOPLASTY STATUS: ICD-10-CM

## 2020-01-11 DIAGNOSIS — E66.9 OBESITY, UNSPECIFIED: ICD-10-CM

## 2020-01-11 DIAGNOSIS — I25.10 ATHEROSCLEROTIC HEART DISEASE OF NATIVE CORONARY ARTERY WITHOUT ANGINA PECTORIS: Primary | ICD-10-CM

## 2020-01-11 LAB
ALBUMIN SERPL-MCNC: 3.9 G/DL (ref 3.4–5)
ALBUMIN/CREAT UR: 24.6 UG/MG
ALP SERPL-CCNC: 67 IU/L (ref 35–126)
ALT SERPL-CCNC: 21 IU/L (ref 16–63)
ANION GAP SERPL CALC-SCNC: 10 MEQ/L (ref 3–15)
AST SERPL-CCNC: 20 IU/L (ref 15–41)
BILIRUB SERPL-MCNC: 0.8 MG/DL (ref 0.3–1.2)
BUN SERPL-MCNC: 10 MG/DL (ref 8–20)
CALCIUM SERPL-MCNC: 9.3 MG/DL (ref 8.9–10.3)
CHLORIDE SERPL-SCNC: 103 MEQ/L (ref 98–109)
CHOLEST SERPL-MCNC: 105 MG/DL
CO2 SERPL-SCNC: 27 MEQ/L (ref 22–32)
CREAT SERPL-MCNC: 1 MG/DL
CREAT UR-MCNC: 234.3 MG/DL
ERYTHROCYTE [DISTWIDTH] IN BLOOD BY AUTOMATED COUNT: 12 % (ref 11.6–14.4)
EST. AVERAGE GLUCOSE BLD GHB EST-MCNC: 186 MG/DL
GFR SERPL CREATININE-BSD FRML MDRD: >60 ML/MIN/1.73M*2
GLUCOSE SERPL-MCNC: 190 MG/DL (ref 70–99)
HBA1C MFR BLD HPLC: 8.1 %
HCT VFR BLDCO AUTO: 41.9 % (ref 40.1–51)
HDLC SERPL-MCNC: 38 MG/DL
HDLC SERPL: 2.8 {RATIO}
HGB BLD-MCNC: 14 G/DL
LDLC SERPL CALC-MCNC: 52 MG/DL
MCH RBC QN AUTO: 29.2 PG (ref 28–33.2)
MCHC RBC AUTO-ENTMCNC: 33.4 G/DL (ref 32.2–36.5)
MCV RBC AUTO: 87.5 FL (ref 83–98)
MICROALBUMIN UR-MCNC: 57.7 MG/L
NONHDLC SERPL-MCNC: 67 MG/DL
PDW BLD AUTO: 10.8 FL (ref 9.4–12.4)
PLATELET # BLD AUTO: 227 K/UL
POTASSIUM SERPL-SCNC: 4.6 MEQ/L (ref 3.6–5.1)
PROT SERPL-MCNC: 6.5 G/DL (ref 6–8.2)
RBC # BLD AUTO: 4.79 M/UL (ref 4.5–5.8)
SODIUM SERPL-SCNC: 140 MEQ/L (ref 136–144)
TRIGL SERPL-MCNC: 74 MG/DL (ref 30–149)
WBC # BLD AUTO: 9.06 K/UL

## 2020-01-11 PROCEDURE — 80053 COMPREHEN METABOLIC PANEL: CPT

## 2020-01-11 PROCEDURE — 85027 COMPLETE CBC AUTOMATED: CPT

## 2020-01-11 PROCEDURE — 82570 ASSAY OF URINE CREATININE: CPT

## 2020-01-11 PROCEDURE — 80061 LIPID PANEL: CPT

## 2020-01-11 PROCEDURE — 36415 COLL VENOUS BLD VENIPUNCTURE: CPT

## 2020-01-11 PROCEDURE — 83036 HEMOGLOBIN GLYCOSYLATED A1C: CPT

## 2020-07-30 ENCOUNTER — TRANSCRIBE ORDERS (OUTPATIENT)
Dept: CARDIOLOGY | Facility: HOSPITAL | Age: 70
End: 2020-07-30

## 2020-07-30 ENCOUNTER — APPOINTMENT (OUTPATIENT)
Dept: LAB | Facility: HOSPITAL | Age: 70
End: 2020-07-30
Attending: INTERNAL MEDICINE
Payer: MEDICARE

## 2020-07-30 DIAGNOSIS — I25.10 ATHEROSCLEROTIC HEART DISEASE OF NATIVE CORONARY ARTERY WITHOUT ANGINA PECTORIS: ICD-10-CM

## 2020-07-30 DIAGNOSIS — Z98.61 CORONARY ANGIOPLASTY STATUS: ICD-10-CM

## 2020-07-30 DIAGNOSIS — E66.9 OBESITY, UNSPECIFIED: ICD-10-CM

## 2020-07-30 DIAGNOSIS — E11.9 TYPE 2 DIABETES MELLITUS WITHOUT COMPLICATIONS (CMS/HCC): ICD-10-CM

## 2020-07-30 DIAGNOSIS — I25.10 ATHEROSCLEROTIC HEART DISEASE OF NATIVE CORONARY ARTERY WITHOUT ANGINA PECTORIS: Primary | ICD-10-CM

## 2020-07-30 DIAGNOSIS — Z95.1 PRESENCE OF AORTOCORONARY BYPASS GRAFT: ICD-10-CM

## 2020-07-30 DIAGNOSIS — E78.2 MIXED HYPERLIPIDEMIA: ICD-10-CM

## 2020-07-30 DIAGNOSIS — I11.9 HYPERTENSIVE HEART DISEASE WITHOUT HEART FAILURE: ICD-10-CM

## 2020-07-30 LAB
ALBUMIN SERPL-MCNC: 4.3 G/DL (ref 3.4–5)
ALP SERPL-CCNC: 68 IU/L (ref 35–126)
ALT SERPL-CCNC: 22 IU/L (ref 16–63)
ANION GAP SERPL CALC-SCNC: 11 MEQ/L (ref 3–15)
AST SERPL-CCNC: 23 IU/L (ref 15–41)
BILIRUB SERPL-MCNC: 1.7 MG/DL (ref 0.3–1.2)
BUN SERPL-MCNC: 13 MG/DL (ref 8–20)
CALCIUM SERPL-MCNC: 9.7 MG/DL (ref 8.9–10.3)
CHLORIDE SERPL-SCNC: 102 MEQ/L (ref 98–109)
CHOLEST SERPL-MCNC: 127 MG/DL
CO2 SERPL-SCNC: 26 MEQ/L (ref 22–32)
CREAT SERPL-MCNC: 0.7 MG/DL (ref 0.8–1.3)
ERYTHROCYTE [DISTWIDTH] IN BLOOD BY AUTOMATED COUNT: 12.3 % (ref 11.6–14.4)
EST. AVERAGE GLUCOSE BLD GHB EST-MCNC: 146 MG/DL
GFR SERPL CREATININE-BSD FRML MDRD: >60 ML/MIN/1.73M*2
GLUCOSE SERPL-MCNC: 133 MG/DL (ref 70–99)
HBA1C MFR BLD HPLC: 6.7 %
HCT VFR BLDCO AUTO: 41.9 % (ref 40.1–51)
HDLC SERPL-MCNC: 44 MG/DL
HDLC SERPL: 2.9 {RATIO}
HGB BLD-MCNC: 14.3 G/DL (ref 13.7–17.5)
LDLC SERPL CALC-MCNC: 64 MG/DL
MCH RBC QN AUTO: 30 PG (ref 28–33.2)
MCHC RBC AUTO-ENTMCNC: 34.1 G/DL (ref 32.2–36.5)
MCV RBC AUTO: 87.8 FL (ref 83–98)
MICROALBUMIN UR-MCNC: 7 MG/L
NONHDLC SERPL-MCNC: 83 MG/DL
PDW BLD AUTO: 10.7 FL (ref 9.4–12.4)
PLATELET # BLD AUTO: 249 K/UL (ref 150–350)
POTASSIUM SERPL-SCNC: 4.2 MEQ/L (ref 3.6–5.1)
PROT SERPL-MCNC: 6.7 G/DL (ref 6–8.2)
RBC # BLD AUTO: 4.77 M/UL (ref 4.5–5.8)
SODIUM SERPL-SCNC: 139 MEQ/L (ref 136–144)
TRIGL SERPL-MCNC: 95 MG/DL (ref 30–149)
WBC # BLD AUTO: 10.11 K/UL (ref 3.8–10.5)

## 2020-07-30 PROCEDURE — 82043 UR ALBUMIN QUANTITATIVE: CPT

## 2020-07-30 PROCEDURE — 80061 LIPID PANEL: CPT

## 2020-07-30 PROCEDURE — 36415 COLL VENOUS BLD VENIPUNCTURE: CPT

## 2020-07-30 PROCEDURE — 85027 COMPLETE CBC AUTOMATED: CPT

## 2020-07-30 PROCEDURE — 80053 COMPREHEN METABOLIC PANEL: CPT

## 2020-07-30 PROCEDURE — 83036 HEMOGLOBIN GLYCOSYLATED A1C: CPT

## 2020-08-25 ENCOUNTER — TRANSCRIBE ORDERS (OUTPATIENT)
Dept: SCHEDULING | Age: 70
End: 2020-08-25

## 2020-08-25 DIAGNOSIS — Z11.59 ENCOUNTER FOR SCREENING FOR OTHER VIRAL DISEASES: Primary | ICD-10-CM

## 2020-08-30 ENCOUNTER — CLINICAL SUPPORT (OUTPATIENT)
Dept: LAB | Age: 70
End: 2020-08-30
Attending: INTERNAL MEDICINE
Payer: MEDICARE

## 2020-08-30 DIAGNOSIS — Z11.59 ENCOUNTER FOR SCREENING FOR OTHER VIRAL DISEASES: ICD-10-CM

## 2020-08-30 PROCEDURE — U0002 COVID-19 LAB TEST NON-CDC: HCPCS

## 2020-08-31 LAB — SARS-COV-2 RNA RESP QL NAA+PROBE: NOT DETECTED

## 2020-08-31 NOTE — RESULT ENCOUNTER NOTE
Pt has a procedure scheduled for 9/3/20.  Pt had a COVID-19 test done on 8/30/20.    Result routed to patient's surgeon and PCP.

## 2020-09-03 ENCOUNTER — ANESTHESIA (OUTPATIENT)
Dept: ENDOSCOPY | Facility: HOSPITAL | Age: 70
End: 2020-09-03
Payer: MEDICARE

## 2020-09-03 ENCOUNTER — ANESTHESIA EVENT (OUTPATIENT)
Dept: ENDOSCOPY | Facility: HOSPITAL | Age: 70
End: 2020-09-03
Payer: MEDICARE

## 2020-09-03 ENCOUNTER — HOSPITAL ENCOUNTER (OUTPATIENT)
Facility: HOSPITAL | Age: 70
Discharge: HOME | End: 2020-09-03
Attending: INTERNAL MEDICINE | Admitting: INTERNAL MEDICINE
Payer: MEDICARE

## 2020-09-03 VITALS
HEIGHT: 68 IN | TEMPERATURE: 97.5 F | SYSTOLIC BLOOD PRESSURE: 147 MMHG | HEART RATE: 79 BPM | DIASTOLIC BLOOD PRESSURE: 75 MMHG | WEIGHT: 227 LBS | OXYGEN SATURATION: 96 % | BODY MASS INDEX: 34.4 KG/M2 | RESPIRATION RATE: 18 BRPM

## 2020-09-03 DIAGNOSIS — Z85.038 PERSONAL HISTORY OF COLON CANCER: ICD-10-CM

## 2020-09-03 DIAGNOSIS — K22.70 BARRETT'S ESOPHAGUS WITHOUT DYSPLASIA: ICD-10-CM

## 2020-09-03 PROCEDURE — 25000000 HC PHARMACY GENERAL: Performed by: ANESTHESIOLOGY

## 2020-09-03 PROCEDURE — 37000001 HC ANESTHESIA GENERAL: Performed by: INTERNAL MEDICINE

## 2020-09-03 PROCEDURE — 75000020 HC COLONSCOPY SNARE: Performed by: INTERNAL MEDICINE

## 2020-09-03 PROCEDURE — 0DBK8ZX EXCISION OF ASCENDING COLON, VIA NATURAL OR ARTIFICIAL OPENING ENDOSCOPIC, DIAGNOSTIC: ICD-10-PCS | Performed by: INTERNAL MEDICINE

## 2020-09-03 PROCEDURE — 75000060 HC EGD BIOPSY: Performed by: INTERNAL MEDICINE

## 2020-09-03 PROCEDURE — 0DB68ZX EXCISION OF STOMACH, VIA NATURAL OR ARTIFICIAL OPENING ENDOSCOPIC, DIAGNOSTIC: ICD-10-PCS | Performed by: INTERNAL MEDICINE

## 2020-09-03 PROCEDURE — 25800000 HC PHARMACY IV SOLUTIONS: Performed by: INTERNAL MEDICINE

## 2020-09-03 PROCEDURE — 88305 TISSUE EXAM BY PATHOLOGIST: CPT | Performed by: INTERNAL MEDICINE

## 2020-09-03 PROCEDURE — 0DB58ZX EXCISION OF ESOPHAGUS, VIA NATURAL OR ARTIFICIAL OPENING ENDOSCOPIC, DIAGNOSTIC: ICD-10-PCS | Performed by: INTERNAL MEDICINE

## 2020-09-03 PROCEDURE — 63600000 HC DRUGS/DETAIL CODE: Performed by: ANESTHESIOLOGY

## 2020-09-03 RX ORDER — PROPOFOL 10 MG/ML
INJECTION, EMULSION INTRAVENOUS CONTINUOUS PRN
Status: DISCONTINUED | OUTPATIENT
Start: 2020-09-03 | End: 2020-09-03 | Stop reason: SURG

## 2020-09-03 RX ORDER — EPHEDRINE SULFATE 50 MG/ML
INJECTION, SOLUTION INTRAVENOUS AS NEEDED
Status: DISCONTINUED | OUTPATIENT
Start: 2020-09-03 | End: 2020-09-03 | Stop reason: SURG

## 2020-09-03 RX ORDER — ASPIRIN 325 MG
50000 TABLET, DELAYED RELEASE (ENTERIC COATED) ORAL WEEKLY
COMMUNITY

## 2020-09-03 RX ORDER — LEVOCETIRIZINE DIHYDROCHLORIDE 5 MG/1
5 TABLET, FILM COATED ORAL EVERY EVENING
COMMUNITY

## 2020-09-03 RX ORDER — CALCIUM CARBONATE 300MG(750)
400 TABLET,CHEWABLE ORAL DAILY
COMMUNITY

## 2020-09-03 RX ORDER — UBIDECARENONE 100 MG
100 CAPSULE ORAL DAILY
COMMUNITY

## 2020-09-03 RX ORDER — PROPOFOL 10 MG/ML
INJECTION, EMULSION INTRAVENOUS AS NEEDED
Status: DISCONTINUED | OUTPATIENT
Start: 2020-09-03 | End: 2020-09-03 | Stop reason: SURG

## 2020-09-03 RX ORDER — LIDOCAINE HYDROCHLORIDE 10 MG/ML
INJECTION, SOLUTION INFILTRATION; PERINEURAL AS NEEDED
Status: DISCONTINUED | OUTPATIENT
Start: 2020-09-03 | End: 2020-09-03 | Stop reason: SURG

## 2020-09-03 RX ORDER — SODIUM CHLORIDE 9 MG/ML
INJECTION, SOLUTION INTRAVENOUS CONTINUOUS
Status: DISCONTINUED | OUTPATIENT
Start: 2020-09-03 | End: 2020-09-03 | Stop reason: HOSPADM

## 2020-09-03 RX ORDER — METOPROLOL SUCCINATE 25 MG/1
25 TABLET, EXTENDED RELEASE ORAL DAILY
COMMUNITY

## 2020-09-03 RX ADMIN — PROPOFOL 50 MG: 10 INJECTION, EMULSION INTRAVENOUS at 09:19

## 2020-09-03 RX ADMIN — EPHEDRINE SULFATE 10 MG: 50 INJECTION INTRAVENOUS at 09:42

## 2020-09-03 RX ADMIN — SODIUM CHLORIDE: 9 INJECTION, SOLUTION INTRAVENOUS at 08:56

## 2020-09-03 RX ADMIN — PROPOFOL 40 MG: 10 INJECTION, EMULSION INTRAVENOUS at 09:21

## 2020-09-03 RX ADMIN — PROPOFOL 150 MCG/KG/MIN: 10 INJECTION, EMULSION INTRAVENOUS at 09:19

## 2020-09-03 RX ADMIN — LIDOCAINE HYDROCHLORIDE 5 ML: 10 INJECTION, SOLUTION INFILTRATION; PERINEURAL at 09:19

## 2020-09-03 ASSESSMENT — LIFESTYLE VARIABLES: TOBACCO_USE: 1

## 2020-09-03 ASSESSMENT — COPD QUESTIONNAIRES: COPD: 1

## 2020-09-03 NOTE — OP NOTE
_______________________________________________________________________________  Patient Name: Devin Daniels           Procedure Date: 9/3/2020 8:59 AM  MRN: [id number]                     Account Number: [id number]  YOB: 1950               Age: 70  Gender: Male                          Note Status: Finalized  Attending MD: CLAUS HUTCHINS MD~LISET  _______________________________________________________________________________  Procedure:            Colonoscopy  Indications:          High risk colon cancer surveillance: Personal history  of adenoma less than 10 mm in size, Last colonoscopy:  December 2015  Providers:            CLAUS HUTCHINS MD~LISET (Doctor)  Referring MD:         MURALI MORIN MD  Requesting Provider:  Medicines:            Monitored Anesthesia Care  Complications:        No immediate complications.  _______________________________________________________________________________  Procedure:            After I obtained informed consent, the scope was passed  under direct vision. Throughout the procedure, the  patient's blood pressure, pulse, and oxygen saturations  were monitored continuously. The Colonoscope was  introduced through the anus and advanced to the cecum,  identified by appendiceal orifice and ileocecal valve.  The colonoscopy was performed without difficulty. The  patient tolerated the procedure well. The quality of  the bowel preparation was good. The ileocecal valve,  appendiceal orifice, and rectum were photographed.  Estimated Blood Loss: Estimated blood loss: none.  Findings:  The perianal and digital rectal examinations were normal.  Two sessile polyps were found in the mid ascending colon. The polyps  were 4 mm in size. These polyps were removed with a cold snare.  Resection was complete, but the polyp tissue was only partially  retrieved.  No other significant abnormalities were identified in a careful  examination of the remainder of the colon.  The  retroflexed view of the distal rectum and anal verge was normal and  showed no anal or rectal abnormalities.  Impression:           - Two 4 mm polyps in the mid ascending colon, removed  with a cold snare. Complete resection. Partial  retrieval.  - The distal rectum and anal verge are normal on  retroflexion view.  Recommendation:       - Patient has a contact number available for  emergencies. The signs and symptoms of potential  delayed complications were discussed with the patient.  Return to normal activities tomorrow. Written discharge  instructions were provided to the patient.  - Resume previous diet.  - Continue present medications.  - Await pathology results.  - Repeat colonoscopy in 5 years for surveillance.  Procedure Code(s):    --- Professional ---  40874, Colonoscopy, flexible; with removal of tumor(s),  polyp(s), or other lesion(s) by snare technique  Diagnosis Code(s):    --- Professional ---  Z86.010, Personal history of colonic polyps  D12.2, Benign neoplasm of ascending colon  CPT copyright 2018 [employer]. All rights reserved.  The codes documented in this report are preliminary and upon  review may  be revised to meet current compliance requirements.  _________________________  CLAUS HUTCHINS MD~LISET  9/3/2020 10:05:31 AM  This report has been signed electronically.  Number of Addenda: 0  Note Initiated On: 9/3/2020 8:59 AM

## 2020-09-03 NOTE — ANESTHESIOLOGIST PRE-PROCEDURE ATTESTATION
Pre-Procedure Patient Identification:  I am the Primary Anesthesiologist and have identified the patient on 09/03/20 at 9:04 AM.   I have confirmed the following procedure(s) Colonoscopy, EGD will be performed by the following surgeon/proceduralist Max Mcbride MD.

## 2020-09-03 NOTE — ANESTHESIA PREPROCEDURE EVALUATION
Relevant Problems   CARDIOVASCULAR   (+) CAD (coronary artery disease)   (+) Hypertension      RESPIRATORY SYSTEM   (+) COPD (chronic obstructive pulmonary disease) (CMS/HCC)      Other   (+) Type 2 diabetes mellitus (CMS/[facility])       Anesthesia ROS/MED HX    Anesthesia History - neg  Pulmonary    history of tobacco use and ex-smoker   COPD   Sleep apnea Not CPAP Compliant  Cardiovascular   CAD   Cardiac stents   dyslipidemia   hypertension   CABG   Stress test reviewed and ECG reviewed  Comments: beard  GI/Hepatic   GERD  Endo/Other   Diabetes  Body Habitus: Obese  ROS/MED HX Comments:    Cardiology: prior MI with cardiac arrest       ROS/Hx: Colonoscopy (N/A Anus) (90338 CPT(R))      EGD (N/A ) (46582 CPT(R))       Past Surgical History:   Procedure Laterality Date   • CORONARY ARTERY BYPASS GRAFT     • ROTATOR CUFF REPAIR     • SINUS SURGERY         Physical Exam    Airway   Mallampati: III   TM distance: >3 FB   Neck ROM: full  Cardiovascular - normal   Rhythm: regular   Rate: normalPulmonary - normal   clear to auscultation  Other Findings   Beard  Dental - normal        Anesthesia Plan    Plan: general    Technique: total IV anesthesia     Airway: natural airway / supplemental oxygen   ASA 3  Anesthetic plan and risks discussed with: patient  Induction:    intravenous

## 2020-09-03 NOTE — ANESTHESIA POSTPROCEDURE EVALUATION
Patient: Devin Daniels    Procedure Summary     Date:  09/03/20 Room / Location:   GI 1 /  GI    Anesthesia Start:  0914 Anesthesia Stop:  1001    Procedures:       Colonoscopy (N/A Anus)      EGD (N/A ) Diagnosis:       Personal history of colon cancer      Gregory's esophagus without dysplasia      (Personal History Colon Polyps / Gregory's)    Provider:  Max Mcbride MD Responsible Provider:  Alberto Beebe MD    Anesthesia Type:  general ASA Status:  3          Anesthesia Type: general  PACU Vitals     No data found in the last 10 encounters.            Anesthesia Post Evaluation    Pain management: adequate  Patient location during evaluation: PACU  Patient participation: complete - patient participated  Level of consciousness: awake and alert  Cardiovascular status: acceptable  Airway Patency: adequate  Respiratory status: acceptable  Hydration status: acceptable  Anesthetic complications: no

## 2020-09-04 LAB
CASE RPRT: NORMAL
CLINICAL INFO: NORMAL
PATH REPORT.FINAL DX SPEC: NORMAL
PATH REPORT.GROSS SPEC: NORMAL

## 2020-09-04 NOTE — OP NOTE
_______________________________________________________________________________  Patient Name: Devin Daniels           Procedure Date: 9/3/2020 9:01 AM  MRN: [id number]                     Account Number: [id number]  YOB: 1950               Age: 70  Gender: Male                          Note Status: Finalized  Attending MD: CLAUS HUTCHINS MD~LISET  _______________________________________________________________________________  Procedure:            Upper GI endoscopy  Indications:          Follow-up of Gregory's esophagus  Providers:            CLAUS HUTCHINS MD~LISET (Doctor)  Referring MD:         MURALI MORIN MD  Requesting Provider:  Medicines:            Monitored Anesthesia Care  Complications:        No immediate complications.  _______________________________________________________________________________  Procedure:            After obtaining informed consent, the endoscope was  passed under direct vision. Throughout the procedure,  the patient's blood pressure, pulse, and oxygen  saturations were monitored continuously. The  Colonoscope was introduced through the mouth, and  advanced to the second part of duodenum. The upper GI  endoscopy was accomplished without difficulty. The  patient tolerated the procedure well.  Estimated Blood Loss: Estimated blood loss: none.  Findings:  The Z-line was irregular with scattered islands of gastric type mucosa,  and was found 40 cm from the incisors. Biopsies were taken with a cold  forceps for histology.  No other significant abnormalities were identified in a careful  examination of the esophagus.  Striped moderately erythematous mucosa without bleeding was found on the  greater curvature of the gastric antrum. Biopsies were taken with a cold  forceps for histology.  No other significant abnormalities were identified in a careful  examination of the stomach.  The duodenal bulb and second portion of the duodenum were normal.  Impression:            - Z-line irregular, 40 cm from the incisors. Biopsied.  - Erythematous mucosa in the greater curvature of the  gastric antrum. Biopsied.  - Normal duodenal bulb and second portion of the  duodenum.  Recommendation:       - Patient has a contact number available for  emergencies. The signs and symptoms of potential  delayed complications were discussed with the patient.  Return to normal activities tomorrow. Written discharge  instructions were provided to the patient.  - Resume previous diet.  - Continue present medications.  - Await pathology results.  - Repeat upper endoscopy in 3 years for surveillance  based on pathology results.  Procedure Code(s):    --- Professional ---  81029, Esophagogastroduodenoscopy, flexible, transoral;  with biopsy, single or multiple  Diagnosis Code(s):    --- Professional ---  K22.8, Other specified diseases of esophagus  K31.89, Other diseases of stomach and duodenum  K22.70, Gregory's esophagus without dysplasia  CPT copyright 2018 American Medical Association. All rights reserved.  The codes documented in this report are preliminary and upon  review may  be revised to meet current compliance requirements.  _________________________  CLAUS HUTCHINS MD~LISET  9/3/2020 10:02:58 AM  This report has been signed electronically.  Number of Addenda: 0  Note Initiated On: 9/3/2020 9:01 AM

## 2020-12-29 ENCOUNTER — APPOINTMENT (OUTPATIENT)
Dept: LAB | Facility: HOSPITAL | Age: 70
End: 2020-12-29
Attending: INTERNAL MEDICINE
Payer: MEDICARE

## 2020-12-29 DIAGNOSIS — E11.9 DIABETES TYPE 2, NO OCULAR INVOLVEMENT (CMS/HCC): ICD-10-CM

## 2020-12-29 LAB
EST. AVERAGE GLUCOSE BLD GHB EST-MCNC: 140 MG/DL
HBA1C MFR BLD HPLC: 6.5 %

## 2020-12-29 PROCEDURE — 36415 COLL VENOUS BLD VENIPUNCTURE: CPT

## 2020-12-29 PROCEDURE — 83036 HEMOGLOBIN GLYCOSYLATED A1C: CPT

## 2021-02-04 ENCOUNTER — TRANSCRIBE ORDERS (OUTPATIENT)
Dept: CARDIOLOGY | Facility: HOSPITAL | Age: 71
End: 2021-02-04

## 2021-02-04 ENCOUNTER — TRANSCRIBE ORDERS (OUTPATIENT)
Dept: LAB | Facility: HOSPITAL | Age: 71
End: 2021-02-04

## 2021-02-04 ENCOUNTER — APPOINTMENT (OUTPATIENT)
Dept: LAB | Facility: HOSPITAL | Age: 71
End: 2021-02-04
Attending: INTERNAL MEDICINE
Payer: MEDICARE

## 2021-02-04 DIAGNOSIS — I11.9 HYPERTENSIVE HEART DISEASE WITHOUT HEART FAILURE: ICD-10-CM

## 2021-02-04 DIAGNOSIS — I25.10 ATHEROSCLEROTIC HEART DISEASE OF NATIVE CORONARY ARTERY WITHOUT ANGINA PECTORIS: Primary | ICD-10-CM

## 2021-02-04 DIAGNOSIS — E78.2 MIXED HYPERLIPIDEMIA: ICD-10-CM

## 2021-02-04 DIAGNOSIS — E66.9 OBESITY, UNSPECIFIED: ICD-10-CM

## 2021-02-04 DIAGNOSIS — Z95.1 PRESENCE OF AORTOCORONARY BYPASS GRAFT: ICD-10-CM

## 2021-02-04 DIAGNOSIS — E11.9 TYPE 2 DIABETES MELLITUS WITHOUT COMPLICATIONS (CMS/HCC): ICD-10-CM

## 2021-02-04 DIAGNOSIS — Z98.61 CORONARY ANGIOPLASTY STATUS: ICD-10-CM

## 2021-02-04 DIAGNOSIS — I25.10 ATHEROSCLEROTIC HEART DISEASE OF NATIVE CORONARY ARTERY WITHOUT ANGINA PECTORIS: ICD-10-CM

## 2021-02-04 LAB
ALBUMIN SERPL-MCNC: 4.1 G/DL (ref 3.4–5)
ALBUMIN/CREAT UR: 17.3 UG/MG
ALP SERPL-CCNC: 67 IU/L (ref 35–126)
ALT SERPL-CCNC: 39 IU/L (ref 16–63)
ANION GAP SERPL CALC-SCNC: 12 MEQ/L (ref 3–15)
AST SERPL-CCNC: 43 IU/L (ref 15–41)
BILIRUB SERPL-MCNC: 0.9 MG/DL (ref 0.3–1.2)
BUN SERPL-MCNC: 9 MG/DL (ref 8–20)
CALCIUM SERPL-MCNC: 9.8 MG/DL (ref 8.9–10.3)
CHLORIDE SERPL-SCNC: 102 MEQ/L (ref 98–109)
CHOLEST SERPL-MCNC: 138 MG/DL
CO2 SERPL-SCNC: 27 MEQ/L (ref 22–32)
CREAT SERPL-MCNC: 1 MG/DL (ref 0.8–1.3)
CREAT UR-MCNC: 161.8 MG/DL
ERYTHROCYTE [DISTWIDTH] IN BLOOD BY AUTOMATED COUNT: 12.3 % (ref 11.6–14.4)
EST. AVERAGE GLUCOSE BLD GHB EST-MCNC: 146 MG/DL
GFR SERPL CREATININE-BSD FRML MDRD: >60 ML/MIN/1.73M*2
GLUCOSE SERPL-MCNC: 150 MG/DL (ref 70–99)
HBA1C MFR BLD HPLC: 6.7 %
HCT VFR BLDCO AUTO: 41.5 % (ref 40.1–51)
HDLC SERPL-MCNC: 45 MG/DL
HDLC SERPL: 3.1 {RATIO}
HGB BLD-MCNC: 14.1 G/DL (ref 13.7–17.5)
LDLC SERPL CALC-MCNC: 72 MG/DL
MCH RBC QN AUTO: 29.8 PG (ref 28–33.2)
MCHC RBC AUTO-ENTMCNC: 34 G/DL (ref 32.2–36.5)
MCV RBC AUTO: 87.7 FL (ref 83–98)
MICROALBUMIN UR-MCNC: 28 MG/L
NONHDLC SERPL-MCNC: 93 MG/DL
PDW BLD AUTO: 10.1 FL (ref 9.4–12.4)
PLATELET # BLD AUTO: 265 K/UL (ref 150–350)
POTASSIUM SERPL-SCNC: 4.9 MEQ/L (ref 3.6–5.1)
PROT SERPL-MCNC: 6.5 G/DL (ref 6–8.2)
RBC # BLD AUTO: 4.73 M/UL (ref 4.5–5.8)
SODIUM SERPL-SCNC: 141 MEQ/L (ref 136–144)
TRIGL SERPL-MCNC: 107 MG/DL (ref 30–149)
WBC # BLD AUTO: 7.98 K/UL (ref 3.8–10.5)

## 2021-02-04 PROCEDURE — 80061 LIPID PANEL: CPT

## 2021-02-04 PROCEDURE — 36415 COLL VENOUS BLD VENIPUNCTURE: CPT

## 2021-02-04 PROCEDURE — 82043 UR ALBUMIN QUANTITATIVE: CPT

## 2021-02-04 PROCEDURE — 80053 COMPREHEN METABOLIC PANEL: CPT

## 2021-02-04 PROCEDURE — 83036 HEMOGLOBIN GLYCOSYLATED A1C: CPT

## 2021-02-04 PROCEDURE — 85027 COMPLETE CBC AUTOMATED: CPT

## 2021-04-14 DIAGNOSIS — Z23 ENCOUNTER FOR IMMUNIZATION: ICD-10-CM

## 2021-06-22 ENCOUNTER — TRANSCRIBE ORDERS (OUTPATIENT)
Dept: SCHEDULING | Age: 71
End: 2021-06-22

## 2021-06-22 DIAGNOSIS — M54.14 RADICULOPATHY, THORACIC REGION: Primary | ICD-10-CM

## 2021-06-23 ENCOUNTER — HOSPITAL ENCOUNTER (OUTPATIENT)
Dept: RADIOLOGY | Facility: HOSPITAL | Age: 71
Discharge: HOME | End: 2021-06-23
Attending: INTERNAL MEDICINE
Payer: MEDICARE

## 2021-06-23 VITALS — WEIGHT: 230 LBS | BODY MASS INDEX: 34.97 KG/M2

## 2021-06-23 DIAGNOSIS — M54.14 RADICULOPATHY, THORACIC REGION: ICD-10-CM

## 2021-08-25 ENCOUNTER — TRANSCRIBE ORDERS (OUTPATIENT)
Dept: LAB | Facility: HOSPITAL | Age: 71
End: 2021-08-25

## 2021-08-25 ENCOUNTER — APPOINTMENT (OUTPATIENT)
Dept: LAB | Facility: HOSPITAL | Age: 71
End: 2021-08-25
Attending: INTERNAL MEDICINE
Payer: MEDICARE

## 2021-08-25 DIAGNOSIS — E66.01 MORBID (SEVERE) OBESITY DUE TO EXCESS CALORIES (CMS/HCC): ICD-10-CM

## 2021-08-25 DIAGNOSIS — E66.01 MORBID (SEVERE) OBESITY DUE TO EXCESS CALORIES (CMS/HCC): Primary | ICD-10-CM

## 2021-08-25 DIAGNOSIS — I25.10 ATHEROSCLEROTIC HEART DISEASE OF NATIVE CORONARY ARTERY WITHOUT ANGINA PECTORIS: ICD-10-CM

## 2021-08-25 DIAGNOSIS — E66.9 OBESITY, UNSPECIFIED: ICD-10-CM

## 2021-08-25 DIAGNOSIS — Z98.61 CORONARY ANGIOPLASTY STATUS: ICD-10-CM

## 2021-08-25 DIAGNOSIS — E78.2 MIXED HYPERLIPIDEMIA: ICD-10-CM

## 2021-08-25 DIAGNOSIS — I11.9 HYPERTENSIVE HEART DISEASE WITHOUT HEART FAILURE: ICD-10-CM

## 2021-08-25 DIAGNOSIS — E11.9 TYPE 2 DIABETES MELLITUS WITHOUT COMPLICATIONS (CMS/HCC): ICD-10-CM

## 2021-08-25 DIAGNOSIS — Z95.1 PRESENCE OF AORTOCORONARY BYPASS GRAFT: ICD-10-CM

## 2021-08-25 LAB
ALBUMIN SERPL-MCNC: 3.9 G/DL (ref 3.4–5)
ALP SERPL-CCNC: 63 IU/L (ref 35–126)
ALT SERPL-CCNC: 35 IU/L (ref 16–63)
ANION GAP SERPL CALC-SCNC: 9 MEQ/L (ref 3–15)
AST SERPL-CCNC: 27 IU/L (ref 15–41)
BASOPHILS # BLD: 0.05 K/UL (ref 0.01–0.1)
BASOPHILS NFR BLD: 0.6 %
BILIRUB SERPL-MCNC: 0.6 MG/DL (ref 0.3–1.2)
BUN SERPL-MCNC: 12 MG/DL (ref 8–20)
CALCIUM SERPL-MCNC: 9.6 MG/DL (ref 8.9–10.3)
CHLORIDE SERPL-SCNC: 104 MEQ/L (ref 98–109)
CO2 SERPL-SCNC: 27 MEQ/L (ref 22–32)
CREAT SERPL-MCNC: 0.9 MG/DL (ref 0.8–1.3)
DIFFERENTIAL METHOD BLD: NORMAL
EOSINOPHIL # BLD: 0.31 K/UL (ref 0.04–0.54)
EOSINOPHIL NFR BLD: 3.5 %
ERYTHROCYTE [DISTWIDTH] IN BLOOD BY AUTOMATED COUNT: 12.3 % (ref 11.6–14.4)
GFR SERPL CREATININE-BSD FRML MDRD: >60 ML/MIN/1.73M*2
GLUCOSE SERPL-MCNC: 160 MG/DL (ref 70–99)
HCT VFR BLDCO AUTO: 41.1 % (ref 40.1–51)
HGB BLD-MCNC: 13.7 G/DL (ref 13.7–17.5)
IMM GRANULOCYTES # BLD AUTO: 0.02 K/UL (ref 0–0.08)
IMM GRANULOCYTES NFR BLD AUTO: 0.2 %
LYMPHOCYTES # BLD: 2.79 K/UL (ref 1.2–3.5)
LYMPHOCYTES NFR BLD: 31.2 %
MCH RBC QN AUTO: 29.8 PG (ref 28–33.2)
MCHC RBC AUTO-ENTMCNC: 33.3 G/DL (ref 32.2–36.5)
MCV RBC AUTO: 89.3 FL (ref 83–98)
MONOCYTES # BLD: 0.66 K/UL (ref 0.3–1)
MONOCYTES NFR BLD: 7.4 %
NEUTROPHILS # BLD: 5.11 K/UL (ref 1.7–7)
NEUTS SEG NFR BLD: 57.1 %
NRBC BLD-RTO: 0 %
PDW BLD AUTO: 10.5 FL (ref 9.4–12.4)
PLATELET # BLD AUTO: 245 K/UL (ref 150–350)
POTASSIUM SERPL-SCNC: 4.2 MEQ/L (ref 3.6–5.1)
PROT SERPL-MCNC: 6.1 G/DL (ref 6–8.2)
RBC # BLD AUTO: 4.6 M/UL (ref 4.5–5.8)
SODIUM SERPL-SCNC: 140 MEQ/L (ref 136–144)
WBC # BLD AUTO: 8.94 K/UL (ref 3.8–10.5)

## 2021-08-25 PROCEDURE — 36415 COLL VENOUS BLD VENIPUNCTURE: CPT

## 2021-08-25 PROCEDURE — 85025 COMPLETE CBC W/AUTO DIFF WBC: CPT

## 2021-08-25 PROCEDURE — 80053 COMPREHEN METABOLIC PANEL: CPT

## 2022-02-22 ENCOUNTER — TRANSCRIBE ORDERS (OUTPATIENT)
Dept: REGISTRATION | Facility: HOSPITAL | Age: 72
End: 2022-02-22

## 2022-02-22 ENCOUNTER — APPOINTMENT (OUTPATIENT)
Dept: LAB | Facility: HOSPITAL | Age: 72
End: 2022-02-22
Attending: INTERNAL MEDICINE
Payer: MEDICARE

## 2022-02-22 DIAGNOSIS — E11.9 TYPE 2 DIABETES MELLITUS WITHOUT COMPLICATIONS (CMS/HCC): ICD-10-CM

## 2022-02-22 DIAGNOSIS — E11.9 TYPE 2 DIABETES MELLITUS WITHOUT COMPLICATIONS (CMS/HCC): Primary | ICD-10-CM

## 2022-02-22 LAB
EST. AVERAGE GLUCOSE BLD GHB EST-MCNC: 209 MG/DL
HBA1C MFR BLD HPLC: 8.9 %

## 2022-02-22 PROCEDURE — 36415 COLL VENOUS BLD VENIPUNCTURE: CPT

## 2022-02-22 PROCEDURE — 83036 HEMOGLOBIN GLYCOSYLATED A1C: CPT

## 2022-03-14 ENCOUNTER — APPOINTMENT (OUTPATIENT)
Dept: LAB | Facility: HOSPITAL | Age: 72
End: 2022-03-14
Attending: INTERNAL MEDICINE
Payer: MEDICARE

## 2022-03-14 ENCOUNTER — TRANSCRIBE ORDERS (OUTPATIENT)
Dept: REGISTRATION | Facility: HOSPITAL | Age: 72
End: 2022-03-14

## 2022-03-14 DIAGNOSIS — Z95.1 PRESENCE OF AORTOCORONARY BYPASS GRAFT: ICD-10-CM

## 2022-03-14 DIAGNOSIS — I25.10 ATHEROSCLEROTIC HEART DISEASE OF NATIVE CORONARY ARTERY WITHOUT ANGINA PECTORIS: ICD-10-CM

## 2022-03-14 DIAGNOSIS — E11.9 TYPE 2 DIABETES MELLITUS WITHOUT COMPLICATIONS (CMS/HCC): ICD-10-CM

## 2022-03-14 DIAGNOSIS — E78.2 MIXED HYPERLIPIDEMIA: ICD-10-CM

## 2022-03-14 DIAGNOSIS — E66.01 MORBID (SEVERE) OBESITY DUE TO EXCESS CALORIES (CMS/HCC): ICD-10-CM

## 2022-03-14 DIAGNOSIS — Z98.61 CORONARY ANGIOPLASTY STATUS: ICD-10-CM

## 2022-03-14 DIAGNOSIS — I11.9 HYPERTENSIVE HEART DISEASE WITHOUT HEART FAILURE: ICD-10-CM

## 2022-03-14 DIAGNOSIS — E66.01 MORBID (SEVERE) OBESITY DUE TO EXCESS CALORIES (CMS/HCC): Primary | ICD-10-CM

## 2022-03-14 LAB
ALBUMIN SERPL-MCNC: 4 G/DL (ref 3.4–5)
ALBUMIN/CREAT UR: 25.5 UG/MG
ALP SERPL-CCNC: 65 IU/L (ref 35–126)
ALT SERPL-CCNC: 33 IU/L (ref 16–63)
ANION GAP SERPL CALC-SCNC: 10 MEQ/L (ref 3–15)
AST SERPL-CCNC: 28 IU/L (ref 15–41)
BILIRUB SERPL-MCNC: 0.9 MG/DL (ref 0.3–1.2)
BUN SERPL-MCNC: 12 MG/DL (ref 8–20)
CALCIUM SERPL-MCNC: 9.3 MG/DL (ref 8.9–10.3)
CHLORIDE SERPL-SCNC: 101 MEQ/L (ref 98–109)
CHOLEST SERPL-MCNC: 120 MG/DL
CO2 SERPL-SCNC: 26 MEQ/L (ref 22–32)
CREAT SERPL-MCNC: 0.9 MG/DL (ref 0.8–1.3)
CREAT UR-MCNC: 204 MG/DL
ERYTHROCYTE [DISTWIDTH] IN BLOOD BY AUTOMATED COUNT: 12.1 % (ref 11.6–14.4)
EST. AVERAGE GLUCOSE BLD GHB EST-MCNC: 203 MG/DL
GFR SERPL CREATININE-BSD FRML MDRD: >60 ML/MIN/1.73M*2
GLUCOSE SERPL-MCNC: 193 MG/DL (ref 70–99)
HBA1C MFR BLD HPLC: 8.7 %
HCT VFR BLDCO AUTO: 40.8 % (ref 40.1–51)
HDLC SERPL-MCNC: 42 MG/DL
HDLC SERPL: 2.9 {RATIO}
HGB BLD-MCNC: 14.2 G/DL (ref 13.7–17.5)
LDLC SERPL CALC-MCNC: 57 MG/DL
MCH RBC QN AUTO: 30 PG (ref 28–33.2)
MCHC RBC AUTO-ENTMCNC: 34.8 G/DL (ref 32.2–36.5)
MCV RBC AUTO: 86.1 FL (ref 83–98)
MICROALBUMIN UR-MCNC: 52 MG/L
NONHDLC SERPL-MCNC: 78 MG/DL
PDW BLD AUTO: 10.8 FL (ref 9.4–12.4)
PLATELET # BLD AUTO: 234 K/UL (ref 150–350)
POTASSIUM SERPL-SCNC: 4.4 MEQ/L (ref 3.6–5.1)
PROT SERPL-MCNC: 6.4 G/DL (ref 6–8.2)
RBC # BLD AUTO: 4.74 M/UL (ref 4.5–5.8)
SODIUM SERPL-SCNC: 137 MEQ/L (ref 136–144)
TRIGL SERPL-MCNC: 107 MG/DL (ref 30–149)
WBC # BLD AUTO: 8.95 K/UL (ref 3.8–10.5)

## 2022-03-14 PROCEDURE — 82570 ASSAY OF URINE CREATININE: CPT

## 2022-03-14 PROCEDURE — 85027 COMPLETE CBC AUTOMATED: CPT

## 2022-03-14 PROCEDURE — 83036 HEMOGLOBIN GLYCOSYLATED A1C: CPT

## 2022-03-14 PROCEDURE — 36415 COLL VENOUS BLD VENIPUNCTURE: CPT

## 2022-03-14 PROCEDURE — 80053 COMPREHEN METABOLIC PANEL: CPT

## 2022-03-14 PROCEDURE — 80061 LIPID PANEL: CPT

## 2022-04-06 ENCOUNTER — HOSPITAL ENCOUNTER (OUTPATIENT)
Dept: RADIOLOGY | Facility: HOSPITAL | Age: 72
Discharge: HOME | End: 2022-04-06
Attending: INTERNAL MEDICINE
Payer: MEDICARE

## 2022-04-06 DIAGNOSIS — R52 PAIN: ICD-10-CM

## 2022-04-06 PROCEDURE — 73610 X-RAY EXAM OF ANKLE: CPT | Mod: LT

## 2022-04-06 PROCEDURE — 73630 X-RAY EXAM OF FOOT: CPT | Mod: LT

## 2022-07-13 ENCOUNTER — TRANSCRIBE ORDERS (OUTPATIENT)
Dept: SCHEDULING | Age: 72
End: 2022-07-13

## 2022-07-13 DIAGNOSIS — I25.10 ATHEROSCLEROTIC HEART DISEASE OF NATIVE CORONARY ARTERY WITHOUT ANGINA PECTORIS: ICD-10-CM

## 2022-07-13 DIAGNOSIS — M81.0 AGE-RELATED OSTEOPOROSIS WITHOUT CURRENT PATHOLOGICAL FRACTURE: ICD-10-CM

## 2022-07-13 DIAGNOSIS — I70.209 UNSPECIFIED ATHEROSCLEROSIS OF NATIVE ARTERIES OF EXTREMITIES, UNSPECIFIED EXTREMITY (CMS/HCC): Primary | ICD-10-CM

## 2022-07-29 ENCOUNTER — HOSPITAL ENCOUNTER (OUTPATIENT)
Dept: CARDIOLOGY | Facility: HOSPITAL | Age: 72
Discharge: HOME | End: 2022-07-29
Attending: INTERNAL MEDICINE
Payer: MEDICARE

## 2022-07-29 ENCOUNTER — HOSPITAL ENCOUNTER (OUTPATIENT)
Dept: RADIOLOGY | Facility: HOSPITAL | Age: 72
Discharge: HOME | End: 2022-07-29
Attending: INTERNAL MEDICINE
Payer: MEDICARE

## 2022-07-29 DIAGNOSIS — I25.10 ATHEROSCLEROTIC HEART DISEASE OF NATIVE CORONARY ARTERY WITHOUT ANGINA PECTORIS: ICD-10-CM

## 2022-07-29 DIAGNOSIS — M81.0 AGE-RELATED OSTEOPOROSIS WITHOUT CURRENT PATHOLOGICAL FRACTURE: ICD-10-CM

## 2022-07-29 DIAGNOSIS — I70.209 UNSPECIFIED ATHEROSCLEROSIS OF NATIVE ARTERIES OF EXTREMITIES, UNSPECIFIED EXTREMITY (CMS/HCC): ICD-10-CM

## 2022-07-29 PROCEDURE — 77080 DXA BONE DENSITY AXIAL: CPT

## 2022-07-29 PROCEDURE — 93923 UPR/LXTR ART STDY 3+ LVLS: CPT

## 2022-07-30 LAB
LEFT ABI: 1.39
LEFT ARM BP: 138 MMHG
LEFT DORSALIS PEDIS INDEX: 1.2
LEFT DORSALIS PEDIS: 189 MMHG
LEFT LOW THIGH INDEX: 1.52
LEFT LOW THIGH: 240 MMHG
LEFT POST TIBIAL INDEX: 1.39
LEFT POSTERIOR TIBIAL: 219 MMHG
LEFT PROXIMAL CALF INDEX: 1.52
LEFT PROXIMAL CALF: 240 MMHG
RIGHT ABI: 1.39
RIGHT ARM BP: 158 MMHG
RIGHT DORSALIS PEDIS INDEX: 1.34
RIGHT DORSALIS PEDIS: 212 MMHG
RIGHT LOW THIGH INDEX: 1.52
RIGHT LOW THIGH: 240 MMHG
RIGHT POST TIBIAL INDEX: 1.39
RIGHT POSTERIOR TIBIAL: 220 MMHG
RIGHT PROXIMAL CALF INDEX: 1.52
RIGHT PROXIMAL CALF: 240 MMHG

## 2022-10-12 ENCOUNTER — APPOINTMENT (OUTPATIENT)
Dept: LAB | Facility: HOSPITAL | Age: 72
End: 2022-10-12
Attending: INTERNAL MEDICINE
Payer: MEDICARE

## 2022-10-12 ENCOUNTER — TRANSCRIBE ORDERS (OUTPATIENT)
Dept: LAB | Facility: HOSPITAL | Age: 72
End: 2022-10-12

## 2022-10-12 DIAGNOSIS — E11.9 TYPE 2 DIABETES MELLITUS WITHOUT COMPLICATIONS (CMS/HCC): ICD-10-CM

## 2022-10-12 DIAGNOSIS — Z12.5 ENCOUNTER FOR SCREENING FOR MALIGNANT NEOPLASM OF PROSTATE: Primary | ICD-10-CM

## 2022-10-12 DIAGNOSIS — Z12.5 ENCOUNTER FOR SCREENING FOR MALIGNANT NEOPLASM OF PROSTATE: ICD-10-CM

## 2022-10-12 LAB — PSA SERPL-MCNC: 1.07 NG/ML

## 2022-10-12 PROCEDURE — 36415 COLL VENOUS BLD VENIPUNCTURE: CPT

## 2022-10-12 PROCEDURE — 83036 HEMOGLOBIN GLYCOSYLATED A1C: CPT

## 2022-10-12 PROCEDURE — G0103 PSA SCREENING: HCPCS

## 2022-10-13 LAB
EST. AVERAGE GLUCOSE BLD GHB EST-MCNC: 154 MG/DL
HBA1C MFR BLD HPLC: 7 %

## 2023-01-05 ENCOUNTER — APPOINTMENT (OUTPATIENT)
Dept: LAB | Facility: HOSPITAL | Age: 73
End: 2023-01-05
Attending: INTERNAL MEDICINE
Payer: MEDICARE

## 2023-01-05 ENCOUNTER — TRANSCRIBE ORDERS (OUTPATIENT)
Dept: REGISTRATION | Facility: HOSPITAL | Age: 73
End: 2023-01-05

## 2023-01-05 DIAGNOSIS — Z79.899 OTHER LONG TERM (CURRENT) DRUG THERAPY: ICD-10-CM

## 2023-01-05 DIAGNOSIS — E11.9 TYPE 2 DIABETES MELLITUS WITHOUT COMPLICATIONS (CMS/HCC): ICD-10-CM

## 2023-01-05 DIAGNOSIS — E11.9 TYPE 2 DIABETES MELLITUS WITHOUT COMPLICATIONS (CMS/HCC): Primary | ICD-10-CM

## 2023-01-05 LAB
ALBUMIN SERPL-MCNC: 3.9 G/DL (ref 3.4–5)
ALP SERPL-CCNC: 69 IU/L (ref 35–126)
ALT SERPL-CCNC: 30 IU/L (ref 16–63)
ANION GAP SERPL CALC-SCNC: 10 MEQ/L (ref 3–15)
AST SERPL-CCNC: 24 IU/L (ref 15–41)
BILIRUB SERPL-MCNC: 0.5 MG/DL (ref 0.3–1.2)
BUN SERPL-MCNC: 9 MG/DL (ref 8–20)
CALCIUM SERPL-MCNC: 9.2 MG/DL (ref 8.9–10.3)
CHLORIDE SERPL-SCNC: 101 MEQ/L (ref 98–109)
CO2 SERPL-SCNC: 24 MEQ/L (ref 22–32)
CREAT SERPL-MCNC: 1 MG/DL (ref 0.8–1.3)
ERYTHROCYTE [DISTWIDTH] IN BLOOD BY AUTOMATED COUNT: 12.6 % (ref 11.6–14.4)
GFR SERPL CREATININE-BSD FRML MDRD: >60 ML/MIN/1.73M*2
GLUCOSE SERPL-MCNC: 161 MG/DL (ref 70–99)
HCT VFR BLDCO AUTO: 38.2 % (ref 40.1–51)
HGB BLD-MCNC: 13.1 G/DL (ref 13.7–17.5)
MCH RBC QN AUTO: 30.3 PG (ref 28–33.2)
MCHC RBC AUTO-ENTMCNC: 34.3 G/DL (ref 32.2–36.5)
MCV RBC AUTO: 88.4 FL (ref 83–98)
PDW BLD AUTO: 10.7 FL (ref 9.4–12.4)
PLATELET # BLD AUTO: 241 K/UL (ref 150–350)
POTASSIUM SERPL-SCNC: 4.1 MEQ/L (ref 3.6–5.1)
PROT SERPL-MCNC: 6.3 G/DL (ref 6–8.2)
RBC # BLD AUTO: 4.32 M/UL (ref 4.5–5.8)
SODIUM SERPL-SCNC: 135 MEQ/L (ref 136–144)
WBC # BLD AUTO: 10.46 K/UL (ref 3.8–10.5)

## 2023-01-05 PROCEDURE — 36415 COLL VENOUS BLD VENIPUNCTURE: CPT

## 2023-01-05 PROCEDURE — 82040 ASSAY OF SERUM ALBUMIN: CPT

## 2023-01-05 PROCEDURE — 83036 HEMOGLOBIN GLYCOSYLATED A1C: CPT

## 2023-01-05 PROCEDURE — 85027 COMPLETE CBC AUTOMATED: CPT

## 2023-01-06 LAB
EST. AVERAGE GLUCOSE BLD GHB EST-MCNC: 163 MG/DL
HBA1C MFR BLD HPLC: 7.3 %

## 2024-01-08 NOTE — PROGRESS NOTES
Hospital Medicine Service -  Daily Progress Note       SUBJECTIVE   Interval History: No chest pain at this time.  No shortness of breath.     OBJECTIVE      Vital signs in last 24 hours:  Temp:  [36.5 °C (97.7 °F)-36.7 °C (98.1 °F)] 36.7 °C (98 °F)  Heart Rate:  [80-91] 84  Resp:  [15-18] 18  BP: (129-180)/() 140/89  No intake or output data in the 24 hours ending 05/06/19 1022    PHYSICAL EXAMINATION      Physical Exam   Constitutional: He appears well-developed and well-nourished. No distress.   HENT:   Head: Normocephalic and atraumatic.   Eyes: Right eye exhibits no discharge. Left eye exhibits no discharge. No scleral icterus.   Neck: Normal range of motion. Neck supple.   Cardiovascular: Normal rate, regular rhythm, normal heart sounds and intact distal pulses.    No murmur heard.  Pulmonary/Chest: Effort normal and breath sounds normal. No respiratory distress. He has no wheezes.   Abdominal: Soft. Bowel sounds are normal. He exhibits no distension. There is no tenderness. There is no guarding.   Musculoskeletal: He exhibits no edema or deformity.   Neurological: He is alert.   Skin: Skin is warm and dry. No rash noted. He is not diaphoretic. No erythema.   Psychiatric: He has a normal mood and affect. His behavior is normal.   Vitals reviewed.     LABS / IMAGING / TELE      Labs  Lab Results   Component Value Date    WBC 7.88 05/05/2019    HGB 13.1 (L) 05/05/2019    HCT 38.6 (L) 05/05/2019    MCV 86.2 05/05/2019     05/05/2019     Lab Results   Component Value Date    GLUCOSE 181 (H) 05/05/2019    CALCIUM 8.9 05/05/2019     05/05/2019    K 3.9 05/05/2019    CO2 24 05/05/2019     05/05/2019    BUN 11 05/05/2019    CREATININE 0.7 (L) 05/05/2019     Lab Results   Component Value Date    ALT 32 05/05/2019    AST 27 05/05/2019    ALKPHOS 64 05/05/2019    BILITOT 0.3 05/05/2019     No results found for: INR, PROTIME    Imaging  X-ray Chest 1 View    Result Date: 5/6/2019  IMPRESSION:  Ok to add CLAUDIA, vit. D.   No acute cardiopulmonary disease.       ECG/Telemetry  I have independently reviewed the telemetry. No events for the last 24 hours.    ASSESSMENT AND PLAN      * Chest pain   Assessment & Plan    Patient presenting with intermittent chest pain from last 2 to 3 days got worse versus 4 PM prior to arrival with initially low intensity pain resolving on its own now intermittent became intensity pain resolving to low intensity but not going away despite using nitro and aspirin.  Patient with history of Gregory's esophagus and GERD has been on chronic Prilosec twice daily over-the-counter for years.  Troponins negative x3.  Had symptoms of paresthesia involving left upper extremity.    Continuing aspirin and statin.  Cardiac stress test today depending on results may need cardiac cath versus discharge home with trial of oral Protonix 40 mg twice daily and follow-up with GI  Is not on any beta-blocker at home.  Will defer to cardiology.  Consulting cardiology.  Possible discharge today if cardiac stress test negative.     COPD (chronic obstructive pulmonary disease) (CMS/[facility]) ([facility])   Assessment & Plan    Stable, no flare at this time.  Ordering DuoNeb's as needed     CAD (coronary artery disease)   Assessment & Plan    History of coronary artery disease, history of cardiac arrest  CABG approximately 13 years ago  Continuing aspirin and statin     Hypertension   Assessment & Plan    Blood pressure stable  Continuing lisinopril and amlodipine from home     Type 2 diabetes mellitus (CMS/[facility]) ([facility])   Assessment & Plan    Takes metformin at home, will hold for now  Last hemoglobin A1c 7.9.  On Victoza.  Accu-Cheks and sliding scale.          VTE Assessment: [place]    Code Status: Full Code   Spent approximately half an hour on evaluation which includes review of the history and physical formulation plan discussion the case with the patient bedside.  Estimated discharge date: 5/6/2019     Randy Guillen MD  5/6/2019  10:22  AM

## (undated) DEVICE — MOUTHPIECE 60FR ENDO BITEBLOCK

## (undated) DEVICE — E-TRAP POLYECTOMY

## (undated) DEVICE — FORCEP COLD RADIAL JAW

## (undated) DEVICE — SNARE COLD EXACTO9MM